# Patient Record
Sex: MALE | Race: BLACK OR AFRICAN AMERICAN | NOT HISPANIC OR LATINO | Employment: UNEMPLOYED | URBAN - METROPOLITAN AREA
[De-identification: names, ages, dates, MRNs, and addresses within clinical notes are randomized per-mention and may not be internally consistent; named-entity substitution may affect disease eponyms.]

---

## 2017-11-03 ENCOUNTER — APPOINTMENT (OUTPATIENT)
Dept: PHYSICAL MEDICINE AND REHAB | Facility: MEDICAL CENTER | Age: 32
End: 2017-11-03
Payer: MEDICARE

## 2018-01-20 PROCEDURE — 99283 EMERGENCY DEPT VISIT LOW MDM: CPT

## 2018-01-20 ASSESSMENT — PAIN SCALES - GENERAL
PAINLEVEL_OUTOF10: 9
PAINLEVEL_OUTOF10: 9

## 2018-01-21 ENCOUNTER — HOSPITAL ENCOUNTER (EMERGENCY)
Facility: MEDICAL CENTER | Age: 33
End: 2018-01-21
Attending: EMERGENCY MEDICINE
Payer: MEDICARE

## 2018-01-21 VITALS
HEART RATE: 98 BPM | RESPIRATION RATE: 16 BRPM | OXYGEN SATURATION: 98 % | TEMPERATURE: 98.7 F | BODY MASS INDEX: 25.25 KG/M2 | SYSTOLIC BLOOD PRESSURE: 110 MMHG | DIASTOLIC BLOOD PRESSURE: 68 MMHG | HEIGHT: 70 IN | WEIGHT: 176.37 LBS

## 2018-01-21 DIAGNOSIS — S00.83XA CONTUSION OF FACE, INITIAL ENCOUNTER: ICD-10-CM

## 2018-01-21 PROCEDURE — 700102 HCHG RX REV CODE 250 W/ 637 OVERRIDE(OP): Performed by: EMERGENCY MEDICINE

## 2018-01-21 PROCEDURE — A9270 NON-COVERED ITEM OR SERVICE: HCPCS | Performed by: EMERGENCY MEDICINE

## 2018-01-21 RX ORDER — IBUPROFEN 600 MG/1
600 TABLET ORAL ONCE
Status: COMPLETED | OUTPATIENT
Start: 2018-01-21 | End: 2018-01-21

## 2018-01-21 RX ORDER — OXYCODONE HYDROCHLORIDE AND ACETAMINOPHEN 5; 325 MG/1; MG/1
1 TABLET ORAL ONCE
Status: COMPLETED | OUTPATIENT
Start: 2018-01-21 | End: 2018-01-21

## 2018-01-21 RX ADMIN — OXYCODONE HYDROCHLORIDE AND ACETAMINOPHEN 1 TABLET: 5; 325 TABLET ORAL at 02:32

## 2018-01-21 RX ADMIN — IBUPROFEN 600 MG: 600 TABLET, FILM COATED ORAL at 02:32

## 2018-01-21 ASSESSMENT — LIFESTYLE VARIABLES: DO YOU DRINK ALCOHOL: NO
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
## 2018-01-21 NOTE — ED NOTES
Pt given d/c instructions/follow up/ home care instructions, pt verbalized understanding of POC, pt ambulated to ER lobby, steady gait.

## 2018-01-24 ENCOUNTER — OFFICE VISIT (OUTPATIENT)
Dept: MEDICAL GROUP | Facility: MEDICAL CENTER | Age: 33
End: 2018-01-24
Payer: MEDICARE

## 2018-01-24 VITALS
DIASTOLIC BLOOD PRESSURE: 72 MMHG | BODY MASS INDEX: 24.77 KG/M2 | RESPIRATION RATE: 16 BRPM | SYSTOLIC BLOOD PRESSURE: 126 MMHG | HEIGHT: 70 IN | OXYGEN SATURATION: 97 % | HEART RATE: 76 BPM | TEMPERATURE: 98.2 F | WEIGHT: 173 LBS

## 2018-01-24 DIAGNOSIS — M51.36 DDD (DEGENERATIVE DISC DISEASE), LUMBAR: ICD-10-CM

## 2018-01-24 DIAGNOSIS — R11.0 NAUSEA: ICD-10-CM

## 2018-01-24 DIAGNOSIS — G43.809 OTHER MIGRAINE WITHOUT STATUS MIGRAINOSUS, NOT INTRACTABLE: ICD-10-CM

## 2018-01-24 DIAGNOSIS — Z23 INFLUENZA VACCINE NEEDED: ICD-10-CM

## 2018-01-24 DIAGNOSIS — Z86.69 HX OF SEIZURE DISORDER: ICD-10-CM

## 2018-01-24 DIAGNOSIS — Z13.220 SCREENING CHOLESTEROL LEVEL: ICD-10-CM

## 2018-01-24 DIAGNOSIS — M25.50 ARTHRALGIA, UNSPECIFIED JOINT: ICD-10-CM

## 2018-01-24 DIAGNOSIS — F31.60 BIPOLAR DISORDER, MIXED (HCC): ICD-10-CM

## 2018-01-24 PROCEDURE — 99214 OFFICE O/P EST MOD 30 MIN: CPT | Mod: 25 | Performed by: NURSE PRACTITIONER

## 2018-01-24 PROCEDURE — G0008 ADMIN INFLUENZA VIRUS VAC: HCPCS | Performed by: NURSE PRACTITIONER

## 2018-01-24 PROCEDURE — 90686 IIV4 VACC NO PRSV 0.5 ML IM: CPT | Performed by: NURSE PRACTITIONER

## 2018-01-24 RX ORDER — NAPROXEN 500 MG/1
500 TABLET ORAL
Qty: 60 TAB | Refills: 2 | Status: SHIPPED | OUTPATIENT
Start: 2018-01-24 | End: 2018-10-17 | Stop reason: SDUPTHER

## 2018-01-24 RX ORDER — LEVETIRACETAM 500 MG/1
500 TABLET ORAL 2 TIMES DAILY
Qty: 60 TAB | Refills: 1 | Status: SHIPPED | OUTPATIENT
Start: 2018-01-24 | End: 2018-08-29 | Stop reason: SDUPTHER

## 2018-01-24 RX ORDER — RISPERIDONE 3 MG/1
3 TABLET ORAL 2 TIMES DAILY
Qty: 60 TAB | Refills: 1 | Status: SHIPPED | OUTPATIENT
Start: 2018-01-24 | End: 2018-08-29 | Stop reason: SDUPTHER

## 2018-01-24 RX ORDER — TIZANIDINE HYDROCHLORIDE 4 MG/1
4 CAPSULE, GELATIN COATED ORAL 3 TIMES DAILY
Qty: 15 CAP | Refills: 0 | Status: SHIPPED | OUTPATIENT
Start: 2018-01-24 | End: 2018-12-06 | Stop reason: CLARIF

## 2018-01-24 RX ORDER — PROMETHAZINE HYDROCHLORIDE 25 MG/1
25 TABLET ORAL 2 TIMES DAILY PRN
Qty: 60 TAB | Refills: 0 | Status: SHIPPED | OUTPATIENT
Start: 2018-01-24 | End: 2018-08-29 | Stop reason: SDUPTHER

## 2018-01-24 ASSESSMENT — ENCOUNTER SYMPTOMS
DEPRESSION: 1
BACK PAIN: 1

## 2018-01-24 ASSESSMENT — PATIENT HEALTH QUESTIONNAIRE - PHQ9: CLINICAL INTERPRETATION OF PHQ2 SCORE: 0

## 2018-01-24 NOTE — PROGRESS NOTES
Subjective:      Hamlet Islas is a 32 y.o. male who presents with Medication Management (med refill/ review meds)        CC: Patient is a very pleasant patient here today accompanied by his wife to reestablish care as well as for any other medicines for his bipolar disorder, arthralgias, back pain, nausea and referral to pain management and the headache clinic.    HPI Hamlet Islas      1. Bipolar disorder, mixed (CMS-[facility])  Patient has not been seen at this office since October 2016. He moved to [place] in 2016 and just recently moved back to the area to be near his children. He has history of bipolar disorder and states he has already made an appointment to be seen by psychiatry for refill on his medicines but it will be in 3 weeks. He was like to get refills on his Risperdal and Keppra since he has been out for about 2 months.    2. Hx of seizure disorder  Patient also has history of seizure disorder and is currently on Keppra for this and reports no seizures but does need a neurology follow-up.    3. DDD (degenerative disc disease), lumbar  Patient previously was going to Dr. Martin who was treating him for his back pain and arthralgias. He was on tramadol at one time. He states he has had no medicines except for over-the-counter ibuprofen for a while and would like to get into a treatment program again. He had many imaging studies done in 2015.    4. Arthralgia, unspecified joint  Patient also has history of chronic joint pains without rheumatoid arthritis for which she was seen pain management. He would like to get at least a prescription for his Naprosyn refilled.    5. Other migraine without status migrainosus, not intractable  Patient would like to go to her migraine clinic to look into options for migraine treatment including Botox injections. He currently only uses Naprosyn.    6. Nausea  Patient would like to get a refill on his Phenergan which she would use because of the nausea associated with his  psychiatric medicines.    7. Influenza vaccine needed  Patient has not had flu immunization this year.      Social History   Substance Use Topics   • Smoking status: Current Every Day Smoker     Packs/day: 0.50     Years: 2.00     Types: Cigarettes   • Smokeless tobacco: Never Used   • Alcohol use No     Current Outpatient Prescriptions   Medication Sig Dispense Refill   • risperidone (RISPERDAL) 3 MG Tab Take 1 Tab by mouth 2 times a day. 60 Tab 1   • levetiracetam (KEPPRA) 500 MG Tab Take 1 Tab by mouth 2 times a day. 60 Tab 1   • tizanidine (ZANAFLEX) 4 MG capsule Take 1 Cap by mouth 3 times a day. 15 Cap 0   • promethazine (PHENERGAN) 25 MG Tab Take 1 Tab by mouth 2 times a day as needed for Nausea/Vomiting. 60 Tab 0   • naproxen (NAPROSYN) 500 MG Tab Take 1 Tab by mouth 2 times daily with meals as needed. 60 Tab 2   • naproxen (NAPROSYN) 500 MG Tab Take 1 tablet by mouth twice per day as needed for pain 40 Tab 2   • lidocaine (LIDODERM) 5 % Patch Apply 1 Patch to skin as directed every 24 hours. 30 Patch 0     No current facility-administered medications for this visit.      Family History   Problem Relation Age of Onset   • Other Mother      discoid lupus   • Cancer Mother      leukemia   • Hyperlipidemia Mother    • Hypertension Mother    • Cancer Father      lung     Past Medical History:   Diagnosis Date   • Asthma    • Bipolar disorder (CMS-[facility])    • Bipolar disorder with depression (CMS-[facility]) 9/8/2014   • Chronic hip pain 9/8/2014   • Chronic low back pain    • Migraine    • Neuropathy (CMS-HCC)    • Obsessive compulsive disorder    • Post traumatic stress disorder (PTSD)    • Psoriasis    • Schizoaffective disorder (CMS-HCC)    • Scoliosis 9/8/2014   • Tendinitis of foot     left foot, chronic       Review of Systems   Musculoskeletal: Positive for back pain and joint pain.   Psychiatric/Behavioral: Positive for depression.   All other systems reviewed and are negative.         Objective:     /72    "Pulse 76   Temp 36.8 °C (98.2 °F)   Resp 16   Ht 1.778 m (5' 10\")   Wt 78.5 kg (173 lb)   SpO2 97%   BMI 24.82 kg/m²      Physical Exam   Constitutional: He is oriented to person, place, and time. He appears well-developed and well-nourished. No distress.   HENT:   Head: Normocephalic and atraumatic.   Right Ear: External ear normal.   Left Ear: External ear normal.   Nose: Nose normal.   Mouth/Throat: Oropharynx is clear and moist.   Eyes: Conjunctivae are normal. Right eye exhibits no discharge. Left eye exhibits no discharge.   Neck: Normal range of motion. Neck supple. No tracheal deviation present. No thyromegaly present.   Cardiovascular: Normal rate, regular rhythm and normal heart sounds.    No murmur heard.  Pulmonary/Chest: Effort normal and breath sounds normal. No respiratory distress. He has no wheezes. He has no rales.   Musculoskeletal:        Lumbar back: He exhibits decreased range of motion and pain.   Patient points to various joints which also causes him pain although there is been no redness or swelling.   Lymphadenopathy:     He has no cervical adenopathy.   Neurological: He is alert and oriented to person, place, and time. Coordination normal.   Skin: Skin is warm and dry. No rash noted. He is not diaphoretic. No erythema.   Psychiatric: He has a normal mood and affect. His behavior is normal. Judgment and thought content normal.   Nursing note and vitals reviewed.              Assessment/Plan:     1. Bipolar disorder, mixed (CMS-[facility])  I will fill patient's medicines until he can get into psychiatry to take over prescribing his psychiatric medicines.  - risperidone (RISPERDAL) 3 MG Tab; Take 1 Tab by mouth 2 times a day.  Dispense: 60 Tab; Refill: 1  - promethazine (PHENERGAN) 25 MG Tab; Take 1 Tab by mouth 2 times a day as needed for Nausea/Vomiting.  Dispense: 60 Tab; Refill: 0  - TSH; Future  - COMP METABOLIC PANEL; Future    2. Hx of seizure disorder  Patient does not appear to have " had a seizure despite being off medication but I will refill it until he can get in to neurology.  - levetiracetam (KEPPRA) 500 MG Tab; Take 1 Tab by mouth 2 times a day.  Dispense: 60 Tab; Refill: 1  - TSH; Future  - COMP METABOLIC PANEL; Future    3. DDD (degenerative disc disease), lumbar  I have refilled patient's Naprosyn and Zanaflex. I advised him if he feels he needs to be on anything stronger he will need to talk with pain management. He had previously been on tramadol but I would not suggest going back on this with his seizure disorder history.  - REFERRAL TO NEUROLOGY  - tizanidine (ZANAFLEX) 4 MG capsule; Take 1 Cap by mouth 3 times a day.  Dispense: 15 Cap; Refill: 0  - naproxen (NAPROSYN) 500 MG Tab; Take 1 Tab by mouth 2 times daily with meals as needed.  Dispense: 60 Tab; Refill: 2  - REFERRAL TO PAIN CLINIC    4. Arthralgia, unspecified joint  Patient has Naprosyn and muscle relaxer to use for pain for now.  - REFERRAL TO PAIN CLINIC    5. Other migraine without status migrainosus, not intractable    - REFERRAL TO NEUROLOGY  - naproxen (NAPROSYN) 500 MG Tab; Take 1 Tab by mouth 2 times daily with meals as needed.  Dispense: 60 Tab; Refill: 2    6. Nausea  I will prescribe this for one month but if he feels he needs to be on this longer he will need to talk to his psychiatrist because of the risk for tardive dyskinesia.  - promethazine (PHENERGAN) 25 MG Tab; Take 1 Tab by mouth 2 times a day as needed for Nausea/Vomiting.  Dispense: 60 Tab; Refill: 0    7. Influenza vaccine needed  I have placed the below orders and discussed them with an approved delegating provider. The MA is performing the below orders under the direction of Dr. Miguel    - Flu Quad Inj >3 Year Pre-Filled PF    8. Screening cholesterol level    - LIPID PROFILE; Future

## 2018-03-10 ENCOUNTER — HOSPITAL ENCOUNTER (EMERGENCY)
Dept: HOSPITAL 8 - ED | Age: 33
Discharge: HOME | End: 2018-03-10
Payer: MEDICARE

## 2018-03-10 VITALS — SYSTOLIC BLOOD PRESSURE: 120 MMHG | DIASTOLIC BLOOD PRESSURE: 70 MMHG

## 2018-03-10 VITALS — HEIGHT: 72 IN | WEIGHT: 175.93 LBS | BODY MASS INDEX: 23.83 KG/M2

## 2018-03-10 DIAGNOSIS — F17.200: ICD-10-CM

## 2018-03-10 DIAGNOSIS — H53.149: ICD-10-CM

## 2018-03-10 DIAGNOSIS — G43.009: Primary | ICD-10-CM

## 2018-03-10 DIAGNOSIS — F12.10: ICD-10-CM

## 2018-03-10 PROCEDURE — 99283 EMERGENCY DEPT VISIT LOW MDM: CPT

## 2018-03-10 PROCEDURE — 96372 THER/PROPH/DIAG INJ SC/IM: CPT

## 2018-03-15 ENCOUNTER — HOSPITAL ENCOUNTER (EMERGENCY)
Facility: MEDICAL CENTER | Age: 33
End: 2018-03-15
Attending: EMERGENCY MEDICINE
Payer: MEDICARE

## 2018-03-15 VITALS
DIASTOLIC BLOOD PRESSURE: 92 MMHG | WEIGHT: 174.38 LBS | BODY MASS INDEX: 25.83 KG/M2 | RESPIRATION RATE: 18 BRPM | OXYGEN SATURATION: 99 % | HEART RATE: 86 BPM | TEMPERATURE: 97.4 F | SYSTOLIC BLOOD PRESSURE: 115 MMHG | HEIGHT: 69 IN

## 2018-03-15 DIAGNOSIS — S39.012A STRAIN OF LUMBAR REGION, INITIAL ENCOUNTER: ICD-10-CM

## 2018-03-15 PROCEDURE — 700102 HCHG RX REV CODE 250 W/ 637 OVERRIDE(OP): Performed by: EMERGENCY MEDICINE

## 2018-03-15 PROCEDURE — 99283 EMERGENCY DEPT VISIT LOW MDM: CPT

## 2018-03-15 PROCEDURE — A9270 NON-COVERED ITEM OR SERVICE: HCPCS | Performed by: EMERGENCY MEDICINE

## 2018-03-15 RX ORDER — ACETAMINOPHEN 325 MG/1
650 TABLET ORAL ONCE
Status: COMPLETED | OUTPATIENT
Start: 2018-03-15 | End: 2018-03-15

## 2018-03-15 RX ADMIN — ACETAMINOPHEN 650 MG: 325 TABLET, FILM COATED ORAL at 06:06

## 2018-03-15 ASSESSMENT — PAIN SCALES - GENERAL
PAINLEVEL_OUTOF10: 8
PAINLEVEL_OUTOF10: 8

## 2018-03-15 NOTE — ED PROVIDER NOTES
"ED Provider Note      CHIEF COMPLAINT  Chief Complaint   Patient presents with   • Low Back Pain     x4 days, \"It's a viral flare from my fibromyalgia. My RT knee gave out at 10pm and I was up and pacing all night in pain. My RT lower back is all knotted up and crazy with pressure\".    • Knee Pain       HPI  Halmet Islas is a 32 y.o. male who presents with low back pain. Patient is in the process of moving. He has been lifting a lot of boxes. He has a chronically bad right knee that gives away periodically. He states that he was carrying a box his knee gave way he caught himself and didn't fall. He had pain in his right low back since then. This started about 4 days ago. Does not radiate. No bowel or bladder dysfunction. No fevers. No abdominal pain nausea vomiting. He does have a tendency to have back pain in the past as well. He took some naproxen which helped to some degree, but still persistent pain.        REVIEW OF SYSTEMS  Denies any weakness in the lower extremities. No bowel or bladder incontinence or saddle anesthesia. No fevers or chills. No injection drug use or IV drug abuse. No abdominal pain, nausea or vomiting. No dysuria, hematuria or flank pain.    PAST MEDICAL HISTORY  Past Medical History:   Diagnosis Date   • Asthma    • Bipolar disorder (CMS-HCC)    • Bipolar disorder with depression (CMS-HCC) 9/8/2014   • Chronic hip pain 9/8/2014   • Chronic low back pain    • Migraine    • Neuropathy (CMS-HCC)    • Obsessive compulsive disorder    • Post traumatic stress disorder (PTSD)    • Psoriasis    • Schizoaffective disorder (CMS-HCC)    • Scoliosis 9/8/2014   • Tendinitis of foot     left foot, chronic       FAMILY HISTORY  Family History   Problem Relation Age of Onset   • Other Mother      discoid lupus   • Cancer Mother      leukemia   • Hyperlipidemia Mother    • Hypertension Mother    • Cancer Father      lung       SOCIAL HISTORY  Social History   Substance Use Topics   • Smoking status: " "Current Every Day Smoker     Packs/day: 0.50     Years: 2.00     Types: Cigarettes   • Smokeless tobacco: Never Used   • Alcohol use No       SURGICAL HISTORY  Past Surgical History:   Procedure Laterality Date   • OTHER      oral       CURRENT MEDICATIONS  Home Medications    **Home medications have not yet been reviewed for this encounter**         ALLERGIES  No Known Allergies      PHYSICAL EXAM  VITAL SIGNS: /92   Pulse 86   Temp 36.3 °C (97.4 °F)   Resp 18   Ht 1.753 m (5' 9\")   Wt 79.1 kg (174 lb 6.1 oz)   SpO2 99%   BMI 25.75 kg/m²   Constitutional: Generally nontoxic  Neck: Grossly normal range of motion  Cardiovascular: Normal heart rate   Thorax & Lungs: No respiratory distress  Abdomen: Bowel sounds normal, soft, non-distended, nontender, no mass  Skin: Warm, Dry, No rash.   Back: Diffuse right lower lumbar tenderness, no step-off or deformity  Extremities: No joint effusion. No gross ligamentous laxity of the right knee. Full range of motion. Ambulatory. No focal bony tenderness  Neurologic: Grossly normal cranial nerves, 5 out of 5 EHL, FHL, gastrocnemius, tibialis anterior. Normal gait. Normal sensory throughout.      COURSE & MEDICAL DECISION MAKING    Patient presents with acute exacerbation of chronic back pain. History is most consistent with a strain. No AHCPR risk factors. He was given Tylenol. Advised continue naproxen as needed. Discussed proper use of Tylenol. He states he will follow-up with his doctor this week. I asked him to return to the ER for any acute symptoms, fevers, weakness in extremities or concern.      FINAL IMPRESSION  1. Acute lumbar strain  2. Acute exacerbation of chronic back pain        This dictation was created using voice recognition software. The accuracy of the dictation is limited to the abilities of the software.  The nursing notes were reviewed and certain aspects of this information were incorporated into this note.    Electronically signed by: Rodriguez" OSMAR Graham, 3/15/2018 6:02 AM

## 2018-03-15 NOTE — ED NOTES
Pt laying in bed with box of life cereal. Pt sleeping, pt woke up, medicated. Pt medicated for pain. Pt given all dc instructions. Pt encouraged to follow up with primary care doctor. Pt stated he would call for appt today. Pt verbalized understanding. Pt ambulated to [facility].

## 2018-03-15 NOTE — ED TRIAGE NOTES
"Chief Complaint   Patient presents with   • Low Back Pain     x4 days, \"It's a viral flare from my fibromyalgia. My RT knee gave out at 10pm and I was up and pacing all night in pain. My RT lower back is all knotted up and crazy with pressure\".    • Knee Pain     Pt amb to triage with strange gait. Denies need for w/c. Denies new numbness, states \"I have it in my regular spots\". Pt returned to lobby. Educated on triage process and to inform staff of any changes.     /92   Pulse 86   Temp 36.3 °C (97.4 °F)   Resp 18   Ht 1.753 m (5' 9\")   Wt 79.1 kg (174 lb 6.1 oz)   SpO2 99%   BMI 25.75 kg/m²     "

## 2018-04-25 ENCOUNTER — HOSPITAL ENCOUNTER (EMERGENCY)
Dept: HOSPITAL 8 - ED | Age: 33
Discharge: HOME | End: 2018-04-25
Payer: MEDICARE

## 2018-04-25 VITALS — WEIGHT: 174.61 LBS | BODY MASS INDEX: 25.28 KG/M2 | HEIGHT: 69.5 IN

## 2018-04-25 VITALS — SYSTOLIC BLOOD PRESSURE: 118 MMHG | DIASTOLIC BLOOD PRESSURE: 76 MMHG

## 2018-04-25 DIAGNOSIS — L03.115: ICD-10-CM

## 2018-04-25 DIAGNOSIS — L03.116: Primary | ICD-10-CM

## 2018-04-25 DIAGNOSIS — J45.909: ICD-10-CM

## 2018-04-25 DIAGNOSIS — M19.90: ICD-10-CM

## 2018-04-25 DIAGNOSIS — F31.9: ICD-10-CM

## 2018-04-25 PROCEDURE — 82962 GLUCOSE BLOOD TEST: CPT

## 2018-04-25 PROCEDURE — 99283 EMERGENCY DEPT VISIT LOW MDM: CPT

## 2018-05-01 ENCOUNTER — HOSPITAL ENCOUNTER (EMERGENCY)
Facility: MEDICAL CENTER | Age: 33
End: 2018-05-01
Attending: EMERGENCY MEDICINE
Payer: MEDICARE

## 2018-05-01 VITALS
WEIGHT: 177.91 LBS | HEIGHT: 70 IN | SYSTOLIC BLOOD PRESSURE: 108 MMHG | RESPIRATION RATE: 18 BRPM | HEART RATE: 111 BPM | OXYGEN SATURATION: 98 % | TEMPERATURE: 98.1 F | DIASTOLIC BLOOD PRESSURE: 68 MMHG | BODY MASS INDEX: 25.47 KG/M2

## 2018-05-01 DIAGNOSIS — L08.9 BACTERIAL SKIN INFECTION: ICD-10-CM

## 2018-05-01 DIAGNOSIS — B96.89 BACTERIAL SKIN INFECTION: ICD-10-CM

## 2018-05-01 PROCEDURE — 99283 EMERGENCY DEPT VISIT LOW MDM: CPT

## 2018-05-01 RX ORDER — CEPHALEXIN 500 MG/1
500 CAPSULE ORAL 4 TIMES DAILY
Qty: 28 CAP | Refills: 0 | Status: SHIPPED | OUTPATIENT
Start: 2018-05-01 | End: 2018-08-29

## 2018-05-01 RX ORDER — SULFAMETHOXAZOLE AND TRIMETHOPRIM 800; 160 MG/1; MG/1
1 TABLET ORAL 2 TIMES DAILY
Qty: 14 TAB | Refills: 0 | Status: SHIPPED | OUTPATIENT
Start: 2018-05-01 | End: 2018-05-08

## 2018-05-01 RX ORDER — MELOXICAM 7.5 MG/1
7.5 TABLET ORAL DAILY
Qty: 30 TAB | Refills: 0 | Status: SHIPPED | OUTPATIENT
Start: 2018-05-01 | End: 2018-08-29 | Stop reason: SDUPTHER

## 2018-05-01 ASSESSMENT — PAIN SCALES - GENERAL: PAINLEVEL_OUTOF10: 8

## 2018-05-01 NOTE — DISCHARGE INSTRUCTIONS
Skin Infections  A skin infection usually develops as a result of disruption of the skin barrier.   CAUSES   A skin infection might occur following:  · Trauma or an injury to the skin such as a cut or insect sting.   · Inflammation (as in eczema).   · Breaks in the skin between the toes (as in athlete's foot).   · Swelling (edema).   SYMPTOMS   The legs are the most common site affected. Usually there is:  · Redness.   · Swelling.   · Pain.   · There may be red streaks in the area of the infection.   TREATMENT   · Minor skin infections may be treated with topical antibiotics, but if the skin infection is severe, hospital care and intravenous (IV) antibiotic treatment may be needed.   · Most often skin infections can be treated with oral antibiotic medicine as well as proper rest and elevation of the affected area until the infection improves.   · If you are prescribed oral antibiotics, it is important to take them as directed and to take all the pills even if you feel better before you have finished all of the medicine.   · You may apply warm compresses to the area for 20-30 minutes 4 times daily.   You might need a tetanus shot now if:  · You have no idea when you had the last one.   · You have never had a tetanus shot before.   · Your wound had dirt in it.   If you need a tetanus shot and you decide not to get one, there is a rare chance of getting tetanus. Sickness from tetanus can be serious. If you get a tetanus shot, your arm may swell and become red and warm at the shot site. This is common and not a problem.  SEEK MEDICAL CARE IF:   The pain and swelling from your infection do not improve within 2 days.   SEEK IMMEDIATE MEDICAL CARE IF:   You develop a fever, chills, or other serious problems.   Document Released: 01/25/2006 Document Revised: 03/11/2013 Document Reviewed: 12/07/2009  [employer]® Patient Information ©2013 [employer].

## 2018-05-01 NOTE — ED TRIAGE NOTES
"Chief Complaint   Patient presents with   • Skin Lesion     x13 days. \"I have all these lesions on my legs.\" Multiple lesions to bilateral thighs; reports some of the \"ooze different colors\".      Pt amb to triage with above. Hx of fibromyalgia. Denies fevers. Denies ETOH/drugs (other than thc). Pt returned to lobby. Educated on triage process and to inform staff of any changes.     /68   Pulse (!) 111   Temp 36.7 °C (98.1 °F) (Temporal)   Resp 18   Ht 1.778 m (5' 10\")   Wt 80.7 kg (177 lb 14.6 oz)   SpO2 98%   BMI 25.53 kg/m²     "

## 2018-05-01 NOTE — ED PROVIDER NOTES
"ED Provider Note    CHIEF COMPLAINT  Chief Complaint   Patient presents with   • Skin Lesion     x13 days. \"I have all these lesions on my legs.\" Multiple lesions to bilateral thighs; reports some of the \"ooze different colors\".        HPI  Hamlet Islas is a 32 y.o. male who presents for evaluation of skin lesions for the past 2 weeks. He states located on his legs. They sometimes draining. He's had no fevers. He is followed primarily by Dr. Pop whom he states he is going to see in the next couple of days.    REVIEW OF SYSTEMS  See HPI for further details. All other systems are negative.     PAST MEDICAL HISTORY  Past Medical History:   Diagnosis Date   • Asthma    • Bipolar disorder (CMS-HCC) ([facility])    • Bipolar disorder with depression (CMS-HCC) ([facility]) 9/8/2014   • Chronic hip pain 9/8/2014   • Chronic low back pain    • Migraine    • Neuropathy (CMS-HCC) ([facility])    • Obsessive compulsive disorder    • Post traumatic stress disorder (PTSD)    • Psoriasis    • Schizoaffective disorder (CMS-HCC) ([facility])    • Scoliosis 9/8/2014   • Tendinitis of foot     left foot, chronic       FAMILY HISTORY  Family History   Problem Relation Age of Onset   • Other Mother      discoid lupus   • Cancer Mother      leukemia   • Hyperlipidemia Mother    • Hypertension Mother    • Cancer Father      lung       SOCIAL HISTORY  Social History     Social History   • Marital status:      Spouse name: N/A   • Number of children: N/A   • Years of education: N/A     Social History Main Topics   • Smoking status: Current Every Day Smoker     Packs/day: 0.50     Years: 2.00     Types: Cigarettes   • Smokeless tobacco: Never Used   • Alcohol use No   • Drug use: Yes     Types: Intravenous, Inhaled   • Sexual activity: Yes     Partners: Female     Other Topics Concern   • Not on file     Social History Narrative   • No narrative on file       SURGICAL HISTORY  Past Surgical History:   Procedure Laterality Date   • OTHER      oral " "      CURRENT MEDICATIONS  Home Medications    **Home medications have not yet been reviewed for this encounter**         ALLERGIES  No Known Allergies    PHYSICAL EXAM  VITAL SIGNS: /68   Pulse (!) 111   Temp 36.7 °C (98.1 °F) (Temporal)   Resp 18   Ht 1.778 m (5' 10\")   Wt 80.7 kg (177 lb 14.6 oz)   SpO2 98%   BMI 25.53 kg/m²     Constitutional: Well developed, Well nourished, No acute distress, Non-toxic appearance.   HENT: Normocephalic, Atraumatic.   Cardiovascular: Tachycardic.   Thorax & Lungs: No respiratory distress.   Skin: Warm, Dry.   Extremities: Bilateral lower extremity small pustular lesions. There are excoriated scabbed lesions as well mostly on the left lateral thigh region.  Musculoskeletal: Good range of motion in all major joints.  Neurologic: Awake alert.    COURSE & MEDICAL DECISION MAKING  Pertinent Labs & Imaging studies reviewed. (See chart for details)  Is a 32-year-old here for evaluation of sores on his legs. Patient does have some pustular lesions that are small. He also has scabbed over lesions that I suspect represent MRSA. I will start him on Keflex and Bactrim. He states he will follow-up with his primary care provider. He is given a discharge instruction sheet on skin infections.    FINAL IMPRESSION  1. Multiple bilateral lower extremity lesions suspicious for MRSA  2.   3.         Electronically signed by: Berhane Cantu, 5/1/2018 7:00 AM    "

## 2018-05-23 ENCOUNTER — TELEPHONE (OUTPATIENT)
Dept: MEDICAL GROUP | Facility: MEDICAL CENTER | Age: 33
End: 2018-05-23

## 2018-06-26 ENCOUNTER — HOSPITAL ENCOUNTER (EMERGENCY)
Dept: HOSPITAL 8 - ED | Age: 33
LOS: 1 days | Discharge: HOME | End: 2018-06-27
Payer: MEDICARE

## 2018-06-26 VITALS — WEIGHT: 195.99 LBS | HEIGHT: 69 IN | BODY MASS INDEX: 29.03 KG/M2

## 2018-06-26 VITALS — SYSTOLIC BLOOD PRESSURE: 120 MMHG | DIASTOLIC BLOOD PRESSURE: 80 MMHG

## 2018-06-26 DIAGNOSIS — H54.7: ICD-10-CM

## 2018-06-26 DIAGNOSIS — J45.909: ICD-10-CM

## 2018-06-26 DIAGNOSIS — H57.8: ICD-10-CM

## 2018-06-26 DIAGNOSIS — H57.12: Primary | ICD-10-CM

## 2018-06-26 DIAGNOSIS — M79.7: ICD-10-CM

## 2018-06-26 PROCEDURE — 99283 EMERGENCY DEPT VISIT LOW MDM: CPT

## 2018-06-29 ENCOUNTER — TELEPHONE (OUTPATIENT)
Dept: MEDICAL GROUP | Facility: MEDICAL CENTER | Age: 33
End: 2018-06-29

## 2018-08-20 ENCOUNTER — HOSPITAL ENCOUNTER (EMERGENCY)
Dept: HOSPITAL 8 - ED | Age: 33
Discharge: HOME | End: 2018-08-20
Payer: MEDICARE

## 2018-08-20 VITALS — BODY MASS INDEX: 25.03 KG/M2 | WEIGHT: 174.83 LBS | HEIGHT: 70 IN

## 2018-08-20 VITALS — SYSTOLIC BLOOD PRESSURE: 96 MMHG | DIASTOLIC BLOOD PRESSURE: 62 MMHG

## 2018-08-20 DIAGNOSIS — J45.909: ICD-10-CM

## 2018-08-20 DIAGNOSIS — F17.200: ICD-10-CM

## 2018-08-20 DIAGNOSIS — J00: ICD-10-CM

## 2018-08-20 DIAGNOSIS — F31.9: ICD-10-CM

## 2018-08-20 DIAGNOSIS — G43.009: Primary | ICD-10-CM

## 2018-08-20 DIAGNOSIS — F41.1: ICD-10-CM

## 2018-08-20 PROCEDURE — 99284 EMERGENCY DEPT VISIT MOD MDM: CPT

## 2018-08-20 PROCEDURE — 71046 X-RAY EXAM CHEST 2 VIEWS: CPT

## 2018-08-29 ENCOUNTER — OFFICE VISIT (OUTPATIENT)
Dept: MEDICAL GROUP | Facility: MEDICAL CENTER | Age: 33
End: 2018-08-29
Payer: MEDICARE

## 2018-08-29 VITALS
TEMPERATURE: 98.6 F | WEIGHT: 171 LBS | HEART RATE: 76 BPM | DIASTOLIC BLOOD PRESSURE: 72 MMHG | HEIGHT: 70 IN | RESPIRATION RATE: 16 BRPM | BODY MASS INDEX: 24.48 KG/M2 | SYSTOLIC BLOOD PRESSURE: 114 MMHG | OXYGEN SATURATION: 98 %

## 2018-08-29 DIAGNOSIS — R11.0 NAUSEA: ICD-10-CM

## 2018-08-29 DIAGNOSIS — M51.36 DDD (DEGENERATIVE DISC DISEASE), LUMBAR: ICD-10-CM

## 2018-08-29 DIAGNOSIS — F41.9 ANXIETY: ICD-10-CM

## 2018-08-29 DIAGNOSIS — Z91.199 NON-COMPLIANCE: ICD-10-CM

## 2018-08-29 DIAGNOSIS — Z86.69 HX OF SEIZURE DISORDER: ICD-10-CM

## 2018-08-29 DIAGNOSIS — F31.60 BIPOLAR DISORDER, MIXED (HCC): ICD-10-CM

## 2018-08-29 PROCEDURE — 99214 OFFICE O/P EST MOD 30 MIN: CPT | Performed by: NURSE PRACTITIONER

## 2018-08-29 RX ORDER — MELOXICAM 7.5 MG/1
7.5 TABLET ORAL DAILY
Qty: 30 TAB | Refills: 0 | Status: SHIPPED | OUTPATIENT
Start: 2018-08-29 | End: 2018-08-30 | Stop reason: SDUPTHER

## 2018-08-29 RX ORDER — RISPERIDONE 3 MG/1
3 TABLET ORAL 2 TIMES DAILY
Qty: 60 TAB | Refills: 1 | Status: SHIPPED | OUTPATIENT
Start: 2018-08-29 | End: 2018-12-06 | Stop reason: CLARIF

## 2018-08-29 RX ORDER — LEVETIRACETAM 500 MG/1
500 TABLET ORAL 2 TIMES DAILY
Qty: 60 TAB | Refills: 1 | Status: ON HOLD | OUTPATIENT
Start: 2018-08-29 | End: 2018-12-10

## 2018-08-29 RX ORDER — PROMETHAZINE HYDROCHLORIDE 25 MG/1
25 TABLET ORAL 2 TIMES DAILY PRN
Qty: 60 TAB | Refills: 0 | Status: SHIPPED | OUTPATIENT
Start: 2018-08-29 | End: 2018-12-06 | Stop reason: CLARIF

## 2018-08-29 RX ORDER — ALPRAZOLAM 0.5 MG/1
0.5 TABLET ORAL NIGHTLY PRN
Qty: 5 TAB | Refills: 0 | Status: SHIPPED | OUTPATIENT
Start: 2018-08-29 | End: 2018-09-03

## 2018-08-29 ASSESSMENT — ENCOUNTER SYMPTOMS
BACK PAIN: 1
NERVOUS/ANXIOUS: 1
DEPRESSION: 1
SEIZURES: 1

## 2018-08-29 NOTE — PROGRESS NOTES
Subjective:      Hamlet Islas is a 33 y.o. male who presents with Ankle Pain (x 2 days)        CC: Patient here today requesting medicine for pain and anxiety as well as needing to referrals for neurology and pain management.    HPI Hamlet Islas      1. DDD (degenerative disc disease), lumbar  Patient at one time was going to  for chronic back problems. He moved to [place] last year and subsequently never followed back. He now would like to get a new referral to pain management. He was referred back in January but schedule he was unable to contact him. He is asking today for tramadol which he has used in the past for pain. He was seen at the emergency room in March for acute back pain and was put on Naprosyn.    2. Anxiety  Patient reports he is also having issues with anxiety. He does have history of bipolar disorder and when I last saw him in January he was referred to psychiatry but apparently never followed up. When questioned how he is still using his Risperdal, he apparently has been going to the [facility] clinic which prescribed it. He states he will be going to [facility] today to set up an appointment.     3. Bipolar disorder, mixed (HCC)  Patient states he is currently on Risperdal through [place] clinic.    4. Non-compliance  Patient has had problems with noncompliance since I have known him. He has history of at least 3 no-shows with pain management and then has had 2 no-shows for myself since May. He has been referred to specialist but has not followed through.    5. Hx of seizure disorder  Patient states he is taking his Keppra but needs a new referral to neurology to be managed on the medication.  Social History   Substance Use Topics   • Smoking status: Current Every Day Smoker     Packs/day: 0.50     Years: 2.00     Types: Cigarettes   • Smokeless tobacco: Never Used   • Alcohol use No     Family History   Problem Relation Age of Onset   • Other Mother         discoid lupus   •  "Cancer Mother         leukemia   • Hyperlipidemia Mother    • Hypertension Mother    • Cancer Father         lung     Past Medical History:   Diagnosis Date   • Asthma    • Bipolar disorder ([facility])    • Bipolar disorder with depression (HCC) 9/8/2014   • Chronic hip pain 9/8/2014   • Chronic low back pain    • Migraine    • Neuropathy (HCC)    • Obsessive compulsive disorder    • Post traumatic stress disorder (PTSD)    • Psoriasis    • Schizoaffective disorder (HCC)    • Scoliosis 9/8/2014   • Tendinitis of foot     left foot, chronic       Review of Systems   Musculoskeletal: Positive for back pain.   Neurological: Positive for seizures.   Psychiatric/Behavioral: Positive for depression. The patient is nervous/anxious.    All other systems reviewed and are negative.         Objective:     /72   Pulse 76   Temp 37 °C (98.6 °F)   Resp 16   Ht 1.778 m (5' 10\")   Wt 77.6 kg (171 lb)   SpO2 98%   BMI 24.54 kg/m²      Physical Exam   Constitutional: He is oriented to person, place, and time. He appears well-developed and well-nourished. No distress.   HENT:   Head: Normocephalic and atraumatic.   Right Ear: External ear normal.   Left Ear: External ear normal.   Nose: Nose normal.   Mouth/Throat: Oropharynx is clear and moist.   Eyes: Conjunctivae are normal. Right eye exhibits no discharge. Left eye exhibits no discharge.   Neck: Normal range of motion. Neck supple. No tracheal deviation present. No thyromegaly present.   Cardiovascular: Normal rate, regular rhythm and normal heart sounds.    No murmur heard.  Pulmonary/Chest: Effort normal and breath sounds normal. No respiratory distress. He has no wheezes. He has no rales.   Lymphadenopathy:     He has no cervical adenopathy.   Neurological: He is alert and oriented to person, place, and time. Coordination normal.   Skin: Skin is warm and dry. No rash noted. He is not diaphoretic. No erythema.   Psychiatric: His behavior is normal. Judgment and thought " content normal. His mood appears anxious.   Nursing note and vitals reviewed.              Assessment/Plan:       1. DDD (degenerative disc disease), lumbar  I have placed a new referral to pain management and will prescribe his meloxicam. It is unclear whether he has been taking meloxicam as well as Naprosyn. I declined refilling his controlled substance.  - REFERRAL TO PAIN CLINIC    2. Anxiety  Patient again reminded he needs to see psychiatry and I will give him #5 Xanax only for breakthrough anxiety. I do not see that he is on an opioid or other controlled substance currently.  - ALPRAZolam (XANAX) 0.5 MG Tab; Take 1 Tab by mouth at bedtime as needed for Anxiety for up to 5 days.  Dispense: 5 Tab; Refill: 0    3. Bipolar disorder, mixed (HCC)  I will refill patient's medication short-term until he can get into psychiatry.  - risperiDONE (RISPERDAL) 3 MG Tab; Take 1 Tab by mouth 2 times a day.  Dispense: 60 Tab; Refill: 1  - promethazine (PHENERGAN) 25 MG Tab; Take 1 Tab by mouth 2 times a day as needed for Nausea/Vomiting.  Dispense: 60 Tab; Refill: 0    4. Non-compliance  I reviewed with patient his frequent no-shows and compliance with referrals to specialty care and medication usage. I explained that he needs to be more compliant for his health status.     5. Hx of seizure disorder  Patient given Keppra but needs to follow-up with neurology in a new referral has been placed.  - REFERRAL TO NEUROLOGY  - levETIRAcetam (KEPPRA) 500 MG Tab; Take 1 Tab by mouth 2 times a day.  Dispense: 60 Tab; Refill: 1

## 2018-08-30 RX ORDER — MELOXICAM 7.5 MG/1
7.5 TABLET ORAL DAILY
Qty: 30 TAB | Refills: 3 | Status: SHIPPED | OUTPATIENT
Start: 2018-08-30 | End: 2018-09-01 | Stop reason: SDUPTHER

## 2018-09-04 RX ORDER — MELOXICAM 7.5 MG/1
TABLET ORAL
Qty: 90 TAB | Refills: 1 | Status: SHIPPED | OUTPATIENT
Start: 2018-09-04 | End: 2018-12-06 | Stop reason: CLARIF

## 2018-09-19 ENCOUNTER — HOSPITAL ENCOUNTER (EMERGENCY)
Facility: MEDICAL CENTER | Age: 33
End: 2018-09-19
Attending: EMERGENCY MEDICINE

## 2018-09-19 ENCOUNTER — APPOINTMENT (OUTPATIENT)
Dept: RADIOLOGY | Facility: MEDICAL CENTER | Age: 33
End: 2018-09-19
Attending: EMERGENCY MEDICINE

## 2018-09-19 VITALS
RESPIRATION RATE: 16 BRPM | HEART RATE: 75 BPM | OXYGEN SATURATION: 98 % | DIASTOLIC BLOOD PRESSURE: 60 MMHG | SYSTOLIC BLOOD PRESSURE: 114 MMHG

## 2018-09-19 DIAGNOSIS — S93.401A SPRAIN OF RIGHT ANKLE, UNSPECIFIED LIGAMENT, INITIAL ENCOUNTER: ICD-10-CM

## 2018-09-19 PROCEDURE — 99283 EMERGENCY DEPT VISIT LOW MDM: CPT

## 2018-09-19 PROCEDURE — 73610 X-RAY EXAM OF ANKLE: CPT | Mod: RT

## 2018-09-19 NOTE — ED PROVIDER NOTES
"ED Provider Note    CHIEF COMPLAINT  No chief complaint on file.       HPI    Primary care provider: No primary care provider on file.   History obtained from: Patient and his wife  History limited by: Patient poor historian    Hamlet Islas is a 34 y.o. male who presents to the ED complaining of right ankle injury \"right before\" coming to the ED.  Wife reports that patient was walking and \"twisted\" his ankle.  Patient is unsure of the exact direction or mechanism of his injury.  He is reporting that his right ankle hurts but won't not tell me exact location.  He otherwise denies pain anywhere else or any other injuries including head injury.  He shakes his head when asked if he has any weakness or sensory change.      REVIEW OF SYSTEMS  Please see HPI for pertinent positives/negatives.     PAST MEDICAL HISTORY  No past medical history on file.     SURGICAL HISTORY  No past surgical history on file.     SOCIAL HISTORY  Social History     Social History Main Topics   • Smoking status: Not on file   • Smokeless tobacco: Not on file   • Alcohol use Not on file   • Drug use: Unknown   • Sexual activity: Not on file        FAMILY HISTORY  No family history on file.     CURRENT MEDICATIONS  Home Medications    **Home medications have not yet been reviewed for this encounter**          ALLERGIES  Allergies not on file     PHYSICAL EXAM  VITAL SIGNS: /60   Pulse 75   Resp 16   SpO2 98%  @[email or web address][[id number]::@     Pulse ox interpretation: 98% I interpret this pulse ox as normal     Constitutional: Well developed, well nourished, sleeping in no apparent distress, nontoxic appearance   HENT: No external signs of trauma, normocephalic   Eyes: No discharge, no icterus   Neck: Trachea midline, no stridor   Cardiovascular: Strong distal pulses and good perfusion   Thorax & Lungs: No respiratory distress   Extremities: No cyanosis, no edema, no gross deformity, mild tenderness along lateral malleolus of right ankle, nontender to " palpation anywhere else including proximally or distally, no apparent laxity on stress testing but patient with discomfort, sensation intact to touch throughout, patient able to wiggle his toes, intact distal pulses with brisk cap refill   Skin: Warm, dry, no pallor/cyanosis, no rash noted   Neuro: Sleepy, no focal deficits noted   Psychiatric: Flat affect        DIAGNOSTIC STUDIES / PROCEDURES        LABS  All labs reviewed by me.     No results found for this or any previous visit.     RADIOLOGY  The radiologist's interpretation of all radiological studies have been reviewed by me.     DX-ANKLE 3+ VIEWS RIGHT   Final Result      No acute osseous abnormality.             COURSE & MEDICAL DECISION MAKING  Nursing notes, VS, PMSFHx reviewed in chart.     Review of past medical records shows the patient without previous visits to this ED.      Differential diagnoses considered include but are not limited to: Fx, subluxation, contusion, strain, sprain       Patient presents with his wife to the ED with above complaint.  X-rays of the right ankle without evidence of acute bony injuries.  Findings discussed with the patient.  Patient noted to be resting comfortably in no acute distress and nontoxic in appearance.  Discussed with patient that he likely has a right ankle sprain based on the history/exam/findings.  Patient was advised on home treatment including ice, elevation and using acetaminophen/ibuprofen as needed.  He was advised on outpatient follow-up and given return to ED precautions.  Patient was able to ambulate without difficulty in the ED.  Patient verbalized understanding and agreed with plan of care with no further questions or concerns.      The patient is referred to a primary physician for blood pressure management, diabetic screening, and for all other preventative health concerns.       FINAL IMPRESSION  1. Sprain of right ankle, unspecified ligament, initial encounter Acute           DISPOSITION  Patient will be discharged home in stable condition.       FOLLOW UP  [facility]  [address] [address]  [phone]  Call today      [facility], Emergency Dept  [address] [address] [phone]    If symptoms worsen         OUTPATIENT MEDICATIONS  There are no discharge medications for this patient.         Electronically signed by: Jeff Newby, 9/19/2018 5:42 AM      Portions of this record were made with voice recognition software.  Despite my review, spelling/grammar/context errors may still remain.  Interpretation of this chart should be taken in this context.

## 2018-09-19 NOTE — ED NOTES
The patient has been provided with discharge education and information.  The patient was also provided with instructions on follow up care and return precautions.  The patient verbalizes understanding of discharge instructions, follow up care, and return precautons.  All questions have been answered.  No RX written.  NAD, A/Ox4, good color and appropriate at time of discharge.  Patient ambulated out of department with steady gait.

## 2018-09-19 NOTE — ED NOTES
Please see down time paperwork and documentation from [id number] to include notes, assessment, orders, and vital signs.

## 2018-09-25 DIAGNOSIS — F41.9 ANXIETY: ICD-10-CM

## 2018-09-25 RX ORDER — ALPRAZOLAM 0.5 MG/1
0.5 TABLET ORAL NIGHTLY PRN
Qty: 5 TAB | Refills: 0
Start: 2018-09-25 | End: 2018-09-30

## 2018-09-28 ENCOUNTER — HOSPITAL ENCOUNTER (EMERGENCY)
Facility: MEDICAL CENTER | Age: 33
End: 2018-09-28
Attending: EMERGENCY MEDICINE
Payer: MEDICARE

## 2018-09-28 VITALS
HEIGHT: 67 IN | OXYGEN SATURATION: 100 % | TEMPERATURE: 97.5 F | HEART RATE: 71 BPM | BODY MASS INDEX: 27.47 KG/M2 | DIASTOLIC BLOOD PRESSURE: 86 MMHG | SYSTOLIC BLOOD PRESSURE: 120 MMHG | WEIGHT: 175 LBS | RESPIRATION RATE: 20 BRPM

## 2018-09-28 DIAGNOSIS — R41.82 ALTERED MENTAL STATUS, UNSPECIFIED ALTERED MENTAL STATUS TYPE: ICD-10-CM

## 2018-09-28 PROCEDURE — 99285 EMERGENCY DEPT VISIT HI MDM: CPT

## 2018-09-28 ASSESSMENT — ENCOUNTER SYMPTOMS
SHORTNESS OF BREATH: 0
FALLS: 0
VOMITING: 0
FEVER: 0

## 2018-09-28 ASSESSMENT — PAIN SCALES - GENERAL
PAINLEVEL_OUTOF10: 0
PAINLEVEL_OUTOF10: 0

## 2018-09-28 NOTE — ED NOTES
Discharge instructions reviewed, no questions at this time. Patient given outpatient addiction resources including [facility] and [facility]. Patient is strongly encouraged to use these resources.    Coffee and rest of sandwich sent with patient as well as EMS blanket.    Belongings returned to patient.    Patient ambulatory off unit.

## 2018-09-28 NOTE — ED PROVIDER NOTES
"ED Provider Note    ED Provider Note    Primary care provider: No primary care provider on file.  Means of arrival: EMS  History obtained from: EMS  History limited by: intoxication    CHIEF COMPLAINT  Chief Complaint   Patient presents with   • ALOC     found on sidewalk. pin point pupils.        HPI  Kyle Brewer is a 89 y.o. male who presents to the Emergency Department via EMS.  Apparently, at [facility], called because the patient was found with decreased responsiveness.  He was brought to the ED for evaluation.        REVIEW OF SYSTEMS  Review of Systems   Constitutional: Negative for fever.   Respiratory: Negative for shortness of breath.    Cardiovascular: Negative for chest pain.   Gastrointestinal: Negative for vomiting.   Musculoskeletal: Negative for falls.       PAST MEDICAL HISTORY   Denies any past medical history    SURGICAL HISTORY  patient denies any surgical history    SOCIAL HISTORY  Social History   Substance Use Topics   • Smoking status: Unknown If Ever Smoked   • Smokeless tobacco: Not on file   • Alcohol use Not on file      Comment: unk      History   Drug use: Unknown     Comment: found c pin point pupils & signs of ivda       FAMILY HISTORY  History reviewed. No pertinent family history.    CURRENT MEDICATIONS  Home Medications     Reviewed by Rahel Bender R.N. (Registered Nurse) on 09/28/18 at 0458  Med List Status: Unable to Obtain   Medication Last Dose Status        Patient Tomer Taking any Medications                       ALLERGIES  No Known Allergies    PHYSICAL EXAM  VITAL SIGNS: /86   Pulse 71   Temp 36.4 °C (97.5 °F)   Resp 20   Ht 1.702 m (5' 7\")   Wt 79.4 kg (175 lb)   SpO2 100%   BMI 27.41 kg/m²   Vitals reviewed.  Constitutional: Patient is oriented to person, place, and time. Appears well-developed and well-nourished.  Resting.  Appears comfortable.  Head: Normocephalic and atraumatic.   Ears: Normal external ears bilaterally.   Mouth/Throat: " Oropharynx is clear and moist  Eyes: Conjunctivae are normal. Pupils are equal, round, and reactive to light.   Neck: Normal range of motion. Neck supple.  Cardiovascular: Normal rate, regular rhythm and normal heart sounds. Normal peripheral pulses.  Pulmonary/Chest: Effort normal and breath sounds normal. No respiratory distress, no wheezes, rhonchi, or rales.   Abdominal: Soft. Bowel sounds are normal. No rebound or guarding, or peritoneal signs.  Musculoskeletal: No edema  Neurological: No focal deficits.   Skin: Skin is warm and dry. No erythema. No pallor.        COURSE & MEDICAL DECISION MAKING  Pertinent Labs & Imaging studies reviewed. (See chart for details)    Obtained and reviewed past medical records.  No prior records to review due to trauma name    5:14 AM - Patient seen and examined at bedside.  Signs of trauma.  He is in no distress.  He does arouse to voice as I enter the room but no other history is available.  EMS attempted IV establishment, unsuccessful.  Patient's has normal vital signs.  He is satting fine on room air.  He does have pinpoint pupils.  No other history available.  Suspect, intoxication, will allow the patient to sleep, will continue to monitor until he is more sober.    6:54 AM patient's reevaluated the bedside.  He still resting.  However, he is aware more awake, opens his eyes to voice says his name is Hamlet.  He has no complaints just that he wants to sleep.  We will allow him to continue to sober up before ambulating and disposition.    8:35 AM patient's reevaluated the bedside.  He is awake and alert, he is eating a boxed lunch.  He states he is not using drugs currently, he was just very tired.  States he is only had about 10 hours of sleep in the last 10 days secondary to being too busy.  States he has 10 children to live with his mother, 3 live with his sister.  For live with their mother and he did not account for the last child.  States he thinks he needs to be  worked up for narcolepsy.  He is overall well-appearing and nontoxic with normal vital signs.  I anticipate discharge to home after the patient finishes his snack.    FINAL IMPRESSION  1. Altered mental status, unspecified altered mental status type

## 2018-09-28 NOTE — ED NOTES
Pt biba for aloc. Pt answers only name upon exam. Placed on monitor. In sight of rn station. Vss. In nad. Pt has pinpoint pupils. Signs of piror ivda abuse. Tbs. Will ctm.

## 2018-09-28 NOTE — ED TRIAGE NOTES
Chief Complaint   Patient presents with   • ALOC     found on sidewalk. pin point pupils.    biba by ems. rbs 99. Alert to noxious stimuli. Vss.

## 2018-09-28 NOTE — ED NOTES
Attempted to arouse patient, able to rouse long enough to ask for breakfast, but goes back to sleep again and is difficult to arouse again. Turkey sandwich at bedside for when patient is able to eat.

## 2018-10-17 DIAGNOSIS — M51.36 DDD (DEGENERATIVE DISC DISEASE), LUMBAR: ICD-10-CM

## 2018-10-17 DIAGNOSIS — G43.809 OTHER MIGRAINE WITHOUT STATUS MIGRAINOSUS, NOT INTRACTABLE: ICD-10-CM

## 2018-10-18 RX ORDER — NAPROXEN 500 MG/1
TABLET ORAL
Qty: 180 TAB | Refills: 0 | Status: SHIPPED | OUTPATIENT
Start: 2018-10-18 | End: 2019-02-15 | Stop reason: CLARIF

## 2018-10-24 ENCOUNTER — HOSPITAL ENCOUNTER (EMERGENCY)
Dept: HOSPITAL 8 - ED | Age: 33
Discharge: HOME | End: 2018-10-24
Payer: MEDICARE

## 2018-10-24 VITALS — HEIGHT: 69 IN | WEIGHT: 181.99 LBS | BODY MASS INDEX: 26.96 KG/M2

## 2018-10-24 VITALS — DIASTOLIC BLOOD PRESSURE: 71 MMHG | SYSTOLIC BLOOD PRESSURE: 120 MMHG

## 2018-10-24 DIAGNOSIS — M72.2: Primary | ICD-10-CM

## 2018-10-24 DIAGNOSIS — J45.909: ICD-10-CM

## 2018-10-24 PROCEDURE — 99282 EMERGENCY DEPT VISIT SF MDM: CPT

## 2018-10-25 ENCOUNTER — HOSPITAL ENCOUNTER (OUTPATIENT)
Dept: LAB | Facility: MEDICAL CENTER | Age: 33
End: 2018-10-25
Attending: NURSE PRACTITIONER
Payer: MEDICARE

## 2018-10-25 ENCOUNTER — APPOINTMENT (OUTPATIENT)
Dept: RADIOLOGY | Facility: MEDICAL CENTER | Age: 33
End: 2018-10-25
Attending: EMERGENCY MEDICINE
Payer: MEDICARE

## 2018-10-25 ENCOUNTER — HOSPITAL ENCOUNTER (EMERGENCY)
Facility: MEDICAL CENTER | Age: 33
End: 2018-10-25
Attending: EMERGENCY MEDICINE
Payer: MEDICARE

## 2018-10-25 VITALS
TEMPERATURE: 98.4 F | WEIGHT: 179.9 LBS | BODY MASS INDEX: 28.18 KG/M2 | DIASTOLIC BLOOD PRESSURE: 82 MMHG | SYSTOLIC BLOOD PRESSURE: 136 MMHG | OXYGEN SATURATION: 94 % | RESPIRATION RATE: 15 BRPM | HEART RATE: 73 BPM

## 2018-10-25 DIAGNOSIS — F31.60 BIPOLAR DISORDER, MIXED (HCC): ICD-10-CM

## 2018-10-25 DIAGNOSIS — Z86.69 HX OF SEIZURE DISORDER: ICD-10-CM

## 2018-10-25 DIAGNOSIS — M79.661 PAIN OF RIGHT LOWER LEG: ICD-10-CM

## 2018-10-25 DIAGNOSIS — Z13.220 SCREENING CHOLESTEROL LEVEL: ICD-10-CM

## 2018-10-25 DIAGNOSIS — M25.572 ACUTE LEFT ANKLE PAIN: ICD-10-CM

## 2018-10-25 LAB
ALBUMIN SERPL BCP-MCNC: 4.3 G/DL (ref 3.2–4.9)
ALBUMIN/GLOB SERPL: 1.4 G/DL
ALP SERPL-CCNC: 56 U/L (ref 30–99)
ALT SERPL-CCNC: 57 U/L (ref 2–50)
ANION GAP SERPL CALC-SCNC: 6 MMOL/L (ref 0–11.9)
AST SERPL-CCNC: 39 U/L (ref 12–45)
BILIRUB SERPL-MCNC: 0.7 MG/DL (ref 0.1–1.5)
BUN SERPL-MCNC: 14 MG/DL (ref 8–22)
CALCIUM SERPL-MCNC: 9.6 MG/DL (ref 8.5–10.5)
CHLORIDE SERPL-SCNC: 104 MMOL/L (ref 96–112)
CHOLEST SERPL-MCNC: 173 MG/DL (ref 100–199)
CO2 SERPL-SCNC: 28 MMOL/L (ref 20–33)
CREAT SERPL-MCNC: 1.12 MG/DL (ref 0.5–1.4)
GLOBULIN SER CALC-MCNC: 3.1 G/DL (ref 1.9–3.5)
GLUCOSE SERPL-MCNC: 91 MG/DL (ref 65–99)
HDLC SERPL-MCNC: 74 MG/DL
LDLC SERPL CALC-MCNC: 87 MG/DL
POTASSIUM SERPL-SCNC: 3.9 MMOL/L (ref 3.6–5.5)
PROT SERPL-MCNC: 7.4 G/DL (ref 6–8.2)
SODIUM SERPL-SCNC: 138 MMOL/L (ref 135–145)
TRIGL SERPL-MCNC: 62 MG/DL (ref 0–149)
TSH SERPL DL<=0.005 MIU/L-ACNC: 0.95 UIU/ML (ref 0.38–5.33)

## 2018-10-25 PROCEDURE — 36415 COLL VENOUS BLD VENIPUNCTURE: CPT

## 2018-10-25 PROCEDURE — 73610 X-RAY EXAM OF ANKLE: CPT | Mod: LT

## 2018-10-25 PROCEDURE — 80061 LIPID PANEL: CPT | Mod: GA

## 2018-10-25 PROCEDURE — 99284 EMERGENCY DEPT VISIT MOD MDM: CPT

## 2018-10-25 PROCEDURE — 80053 COMPREHEN METABOLIC PANEL: CPT

## 2018-10-25 PROCEDURE — 93971 EXTREMITY STUDY: CPT | Mod: RT

## 2018-10-25 PROCEDURE — 700102 HCHG RX REV CODE 250 W/ 637 OVERRIDE(OP): Performed by: EMERGENCY MEDICINE

## 2018-10-25 PROCEDURE — 84443 ASSAY THYROID STIM HORMONE: CPT

## 2018-10-25 PROCEDURE — A9270 NON-COVERED ITEM OR SERVICE: HCPCS | Performed by: EMERGENCY MEDICINE

## 2018-10-25 RX ORDER — GABAPENTIN 400 MG/1
400 CAPSULE ORAL ONCE
Status: COMPLETED | OUTPATIENT
Start: 2018-10-25 | End: 2018-10-25

## 2018-10-25 RX ADMIN — GABAPENTIN 400 MG: 400 CAPSULE ORAL at 02:00

## 2018-10-25 ASSESSMENT — LIFESTYLE VARIABLES: DO YOU DRINK ALCOHOL: NO

## 2018-10-25 ASSESSMENT — PAIN SCALES - GENERAL: PAINLEVEL_OUTOF10: 9

## 2018-10-25 NOTE — ED TRIAGE NOTES
Hamlet Maximino Islas  33 y.o. male  Chief Complaint   Patient presents with   • Leg Pain     Pt. states chronic right leg pain due to tendonitis and plantar fascitis. Pt. states hx of scoliosis as well all contributing to pain in his right leg pain. Pt. states this pain he thinks is probably due to overexertion. Pt. states however the pain is so bad it is now hard for him to walk on his right leg.     /83   Pulse 92   Temp 36.4 °C (97.6 °F)   Resp 19   Wt 81.6 kg (179 lb 14.3 oz)   SpO2 99%   BMI 28.18 kg/m²     Pt amb to triage with steady gait for above complaint.   Pt is alert and oriented, speaking in full sentences, follows commands and responds appropriately to questions. NAD. Resp are even and unlabored.  Pt placed in lobby. Pt educated on triage process. Pt encouraged to alert staff for any changes.

## 2018-10-25 NOTE — ED PROVIDER NOTES
ED Provider Note    CHIEF COMPLAINT  Chief Complaint   Patient presents with   • Leg Pain     Pt. states chronic right leg pain due to tendonitis and plantar fascitis. Pt. states hx of scoliosis as well all contributing to pain in his right leg pain. Pt. states this pain he thinks is probably due to overexertion. Pt. states however the pain is so bad it is now hard for him to walk on his right leg.        HPI    Primary care provider: NANCY Cartagena   History obtained from: Patient  History limited by: None     Hamlet Islas is a 33 y.o. male who presents to the ED complaining of pain to his right lower leg radiating down to his right plantar surface that has been worsening over the past several days because he has been walking a lot.  Patient states that he has walked about 14 miles over the past 3 days because his car broke down.  He denies any injury/trauma.  He does have history of tendinitis and plantar fasciitis.  He also reports history of scoliosis which makes him favor one side when he walks and perhaps making his pain worse.  Patient also states he has fibromyalgia and wants to catch his pain before it gets worse.  He otherwise denies pain anywhere else.  He denies any fever/shortness breath or difficulty breathing.  He does get nausea at times and has been using his Phenergan.  There is no weakness or sensory change.  He denies diarrhea/constipation/dysuria/incontinence.  He has been using naproxen without any improvement.  Patient states that he came to the ED because the pain was making it difficult for him to walk tonight.  He denies any history of blood clots.      REVIEW OF SYSTEMS  Please see HPI for pertinent positives/negatives.     PAST MEDICAL HISTORY  Past Medical History:   Diagnosis Date   • Chronic hip pain 9/8/2014   • Scoliosis 9/8/2014   • Bipolar disorder with depression (HCC) 9/8/2014   • Asthma    • Bipolar disorder (HCC)    • Chronic low back pain    • Migraine    •  Neuropathy (HCC)    • Obsessive compulsive disorder    • Post traumatic stress disorder (PTSD)    • Psoriasis    • Schizoaffective disorder (HCC)    • Tendinitis of foot     left foot, chronic        SURGICAL HISTORY  Past Surgical History:   Procedure Laterality Date   • OTHER      oral        SOCIAL HISTORY  Social History     Social History Main Topics   • Smoking status: Unknown If Ever Smoked   • Smokeless tobacco: Never Used   • Alcohol use No      Comment: unk   • Drug use: Yes     Types: Intravenous, Inhaled   • Sexual activity: Yes     Partners: Female        FAMILY HISTORY  Family History   Problem Relation Age of Onset   • Other Mother         discoid lupus   • Cancer Mother         leukemia   • Hyperlipidemia Mother    • Hypertension Mother    • Cancer Father         lung        CURRENT MEDICATIONS  Home Medications     Reviewed by Beverly Izquierdo R.N. (Registered Nurse) on 10/25/18 at 0115  Med List Status: Partial   Medication Last Dose Status   levETIRAcetam (KEPPRA) 500 MG Tab  Active   lidocaine (LIDODERM) 5 % Patch 8/29/2018 Active   meloxicam (MOBIC) 7.5 MG Tab  Active   naproxen (NAPROSYN) 500 MG Tab  Active   promethazine (PHENERGAN) 25 MG Tab  Active   risperiDONE (RISPERDAL) 3 MG Tab  Active   tizanidine (ZANAFLEX) 4 MG capsule 8/29/2018 Active                 ALLERGIES  No Known Allergies     PHYSICAL EXAM  VITAL SIGNS: /73   Pulse 72   Temp 36.4 °C (97.6 °F)   Resp 18   Wt 81.6 kg (179 lb 14.3 oz)   SpO2 97%   BMI 28.18 kg/m²  @[email or web address][[id number]::@     Pulse ox interpretation: 99% I interpret this pulse ox as normal     Constitutional: Well developed, well nourished, alert in no apparent distress, nontoxic appearance    HENT: No external signs of trauma, normocephalic, oropharynx moist and clear, nose normal    Eyes: PERRL, conjunctiva without erythema, no discharge, no icterus    Neck: Soft and supple, trachea midline, no stridor, no tenderness, no LAD, no JVD, good ROM     Cardiovascular: Regular rate and rhythm, no murmurs/rubs/gallops, strong distal pulses and good perfusion    Thorax & Lungs: No respiratory distress, CTAB   Abdomen: Soft, nontender, nondistended, no guarding, no rebound, normal BS    Back: No CVAT    Extremities: No cyanosis, no edema, no gross deformity, good ROM, diffuse tenderness to palpation of right lower leg without gross deformity/swelling/rash/warmth/crepitus/palpable cords, intact distal pulses with brisk cap refill    Skin: Warm, dry, no pallor/cyanosis, no rash noted    Lymphatic: No lymphadenopathy noted    Neuro: A/O times 3, no focal deficits noted    Psychiatric: Cooperative, no signs of active hallucinations or delusions        DIAGNOSTIC STUDIES / PROCEDURES        LABS  All labs reviewed by me.     Results for orders placed or performed during the hospital encounter of 03/31/16   COMP METABOLIC PANEL   Result Value Ref Range    Sodium 138 135 - 145 mmol/L    Potassium 3.7 3.6 - 5.5 mmol/L    Chloride 104 96 - 112 mmol/L    Co2 26 20 - 33 mmol/L    Anion Gap 8.0 0.0 - 11.9    Glucose 77 65 - 99 mg/dL    Bun 10 8 - 22 mg/dL    Creatinine 1.01 0.50 - 1.40 mg/dL    Calcium 9.6 8.5 - 10.5 mg/dL    AST(SGOT) 29 12 - 45 U/L    ALT(SGPT) 42 2 - 50 U/L    Alkaline Phosphatase 77 30 - 99 U/L    Total Bilirubin 0.9 0.1 - 1.5 mg/dL    Albumin 4.2 3.2 - 4.9 g/dL    Total Protein 7.3 6.0 - 8.2 g/dL    Globulin 3.1 1.9 - 3.5 g/dL    A-G Ratio 1.4 g/dL   URINE DRUG SCREEN W/CONF (AR)   Result Value Ref Range    Urine Amphetamine-Methamphetam Negative Cutoff 300 ng/mL    Barbiturates Negative Cutoff 200 ng/mL    Benzodiazepines Negative Cutoff 200 ng/mL    Propoxyphene Negative Cutoff 300 ng/mL    Cocaine Metabolite Negative Cutoff 150 ng/mL    Methadone Negative Cutoff 150 ng/mL    Codeine-Morphine Negative Cutoff 300 ng/mL    Phencyclidine -Pcp Negative Cutoff 25 ng/mL    Cannabinoid Metab Negative Cutoff 20 ng/mL    Drug Comment Urine Drugs See Note     ESTIMATED GFR   Result Value Ref Range    GFR If African American >60 >60 mL/min/1.73 m 2    GFR If Non African American >60 >60 mL/min/1.73 m 2        RADIOLOGY  The radiologist's interpretation of all radiological studies have been reviewed by me.     DX-ANKLE 3+ VIEWS LEFT   Final Result      No bony abnormality.      US-EXTREMITY VENOUS LOWER UNILAT RIGHT                COURSE & MEDICAL DECISION MAKING  Nursing notes, VS, PMSFHx reviewed in chart.     Review of past medical records shows the patient was last seen in this ED September 28, 2018 after he was found with altered mental status on the sidewalk.      Differential diagnoses considered include but are not limited to: Arthritis, bursitis, tendonitis, DVT/vascular occlusion, radiculopathy, strain/sprain, neuropathy       Patient presents to the ED with above complaint.  Right lower extremity Doppler ultrasound without evidence of DVT.  While in the ED, patient also complained of left ankle pain and therefore ankle x-ray was performed without evidence of acute bony injury.  Patient was given gabapentin for his pain during his ED stay.  I discussed the findings with the patient.  Patient noted to be resting comfortably in no acute distress, nontoxic in appearance.  Low clinical suspicion for more serious acute pathology such as sepsis, compartment syndrome, necrotizing fasciitis, osteomyelitis, cellulitis, abscess, DVT, arterial occlusion given the history/exam/findings.  Discussed with patient his symptoms are likely strain/sprain and he was advised on using ice/heat and acetaminophen/ibuprofen as needed.  He was also noted to have borderline elevated blood pressure and will need outpatient follow-up for further management.  Return to ED precautions were given.  Patient verbalized understanding and agreed with plan of care with no further questions or concerns.      The patient is referred to a primary physician for blood pressure management, diabetic  screening, and for all other preventative health concerns.       FINAL IMPRESSION  1. Pain of right lower leg Acute   2. Acute left ankle pain Acute          DISPOSITION  Patient will be discharged home in stable condition.       FOLLOW UP  NANCY Cartagena  [address] [facility] [address]  [facility] [id number] [phone]    Call today      [facility], Emergency Dept  [address] [facility] [id number] [phone]    If symptoms worsen         OUTPATIENT MEDICATIONS  New Prescriptions    No medications on file          Electronically signed by: Jeff Newby, 10/25/2018 1:34 AM      Portions of this record were made with voice recognition software.  Despite my review, spelling/grammar/context errors may still remain.  Interpretation of this chart should be taken in this context.

## 2018-11-29 ENCOUNTER — HOSPITAL ENCOUNTER (EMERGENCY)
Facility: MEDICAL CENTER | Age: 33
End: 2018-11-29
Attending: EMERGENCY MEDICINE
Payer: MEDICARE

## 2018-11-29 VITALS
SYSTOLIC BLOOD PRESSURE: 122 MMHG | DIASTOLIC BLOOD PRESSURE: 83 MMHG | HEIGHT: 70 IN | RESPIRATION RATE: 16 BRPM | TEMPERATURE: 97.7 F | WEIGHT: 180.12 LBS | BODY MASS INDEX: 25.79 KG/M2 | OXYGEN SATURATION: 97 % | HEART RATE: 82 BPM

## 2018-11-29 DIAGNOSIS — L08.9 WOUND INFECTION: ICD-10-CM

## 2018-11-29 DIAGNOSIS — T14.8XXA WOUND INFECTION: ICD-10-CM

## 2018-11-29 DIAGNOSIS — F12.10 MARIJUANA ABUSE: ICD-10-CM

## 2018-11-29 DIAGNOSIS — F42.4 SKIN PICKING HABIT: ICD-10-CM

## 2018-11-29 DIAGNOSIS — F15.10 METHAMPHETAMINE ABUSE (HCC): ICD-10-CM

## 2018-11-29 PROCEDURE — 99284 EMERGENCY DEPT VISIT MOD MDM: CPT

## 2018-11-29 RX ORDER — CEPHALEXIN 500 MG/1
500 CAPSULE ORAL 4 TIMES DAILY
Qty: 20 CAP | Refills: 0 | Status: SHIPPED | OUTPATIENT
Start: 2018-11-29 | End: 2018-12-04

## 2018-11-29 ASSESSMENT — PAIN SCALES - WONG BAKER: WONGBAKER_NUMERICALRESPONSE: HURTS A LITTLE MORE

## 2018-11-29 ASSESSMENT — LIFESTYLE VARIABLES: DO YOU DRINK ALCOHOL: NO

## 2018-11-29 ASSESSMENT — PAIN SCALES - GENERAL
PAINLEVEL_OUTOF10: 4
PAINLEVEL_OUTOF10: 4

## 2018-11-30 NOTE — ED PROVIDER NOTES
ED Provider Note    Scribed for Jax Ward M.D. by Stuart Apple. 11/29/2018, 11:16 PM.    Primary care provider: NANCY Cartagena  Means of arrival: Walk-in  History obtained from: Patient  History limited by: None    CHIEF COMPLAINT  Chief Complaint   Patient presents with   • Wound Check     Pt has old circular wounds on L hand and R thigh that he would like a doctor to check out. Hx of MRSA. No drainage, redness, or swelling noted.       HPI  Hamlet Islas is a 33 y.o. male who presents to the Emergency Department complaining of small circular wounds on his left hand and right thigh. He states they have been there for the past few weeks and have worsened over the past 3 days. He states he smokes about 0.5 packs of cigarettes per day. He admits to current methamphetamine and marijuana use, as well as past cocaine use. He was diagnosed with MRSA in May. His PCP is BASHIR Kennedy.    REVIEW OF SYSTEMS  See HPI for further details.    PAST MEDICAL HISTORY   has a past medical history of Asthma; Bipolar disorder ([facility]); Bipolar disorder with depression (HCC) (9/8/2014); Chronic hip pain (9/8/2014); Chronic low back pain; Migraine; Neuropathy ([facility]); Obsessive compulsive disorder; Post traumatic stress disorder (PTSD); Psoriasis; Schizoaffective disorder (HCC); Scoliosis (9/8/2014); and Tendinitis of foot.    SURGICAL HISTORY   has a past surgical history that includes other.    SOCIAL HISTORY  Social History   Substance Use Topics   • Smoking status: Current Every Day Smoker     Packs/day: 0.50     Types: Cigarettes   • Smokeless tobacco: Never Used   • Alcohol use Yes      Comment: rare      History   Drug Use: Admits to current meth and marijuana use. Past cocaine use.       FAMILY HISTORY  Family History   Problem Relation Age of Onset   • Other Mother         discoid lupus   • Cancer Mother         leukemia   • Hyperlipidemia Mother    • Hypertension Mother    • Cancer Father         lung  "      CURRENT MEDICATIONS  Reviewed.  See Encounter Summary.     ALLERGIES  No Known Allergies    PHYSICAL EXAM  VITAL SIGNS: /83   Pulse 82   Temp 36.5 °C (97.7 °F) (Temporal)   Resp 16   Ht 1.778 m (5' 10\")   Wt 81.7 kg (180 lb 1.9 oz)   SpO2 97%   BMI 25.84 kg/m²   Constitutional: Alert in no apparent distress.  HENT: No signs of trauma, Bilateral external ears normal, Nose normal.   Eyes: Pupils are equal and reactive, Conjunctiva normal, Non-icteric.   Neck: Normal range of motion, No tenderness, Supple, No stridor.   Lymphatic: No lymphadenopathy noted.   Cardiovascular: Regular rate and rhythm, no murmurs.   Thorax & Lungs: Normal breath sounds, No respiratory distress, No wheezing, No chest tenderness.   Abdomen: Bowel sounds normal, Soft, No tenderness, No masses, No pulsatile masses. No peritoneal signs.  Skin: 6 discrete, superficial, round lesions to right thigh, consistent with skin picking. No evidence of abscess. Insignificant cellulitis.    Extremities: Intact distal pulses, No edema, No tenderness, No cyanosis  Musculoskeletal: Good range of motion in all major joints. No tenderness to palpation or major deformities noted.   Neurologic: Alert , Normal motor function, Normal sensory function, No focal deficits noted.   Psychiatric: Poor eye contact. Admits to meth.    COURSE & MEDICAL DECISION MAKING  Pertinent Labs & Imaging studies reviewed. (See chart for details)    11:16 PM - Patient seen and examined at bedside. I explained that his wounds are consistent with skin picking secondary to drug use. He does not require any labs or imaging at this time. Patient is stable for discharge at this time with prescription for Keflex. I recommended seeking help for stopping drug use. Patient instructed to follow up with primary care and given strict return precautions with any new or worsening symptoms. Patient understands and agrees to plan of care and discharge at this time.       Decision " Making:  This is a 33 y.o. year old male who presents with above complaint.  Patient does admit to methamphetamine abuse and admits to chronic skin picking.  His dermal lesions appeared consistent with this.  In fact he has ongoing behavior even during our exam.  I will empirically start him on antibiotics and have him follow-up at local clinic for outpatient follow-up.    The patient will return for new or worsening symptoms and is stable at the time of discharge.    The patient is referred to a primary physician for blood pressure management, diabetic screening, and for all other preventative health concerns.    DISPOSITION:  Patient will be discharged home in stable condition.    FOLLOW UP:  No follow-up provider specified.    OUTPATIENT MEDICATIONS:  Discharge Medication List as of 11/29/2018 11:28 PM      START taking these medications    Details   cephALEXin (KEFLEX) 500 MG Cap Take 1 Cap by mouth 4 times a day for 5 days., Disp-20 Cap, R-0, Print Rx Paper              FINAL IMPRESSION  1. Wound infection    2. Skin picking habit    3. Methamphetamine abuse (HCC)    4. Marijuana abuse          IStuart (Scribe), am scribing for, and in the presence of, Jax Ward M.D..    Electronically signed by: Stuart Apple (Scribe), 11/29/2018    IJax M.D. personally performed the services described in this documentation, as scribed by Stuart Apple in my presence, and it is both accurate and complete. E.    The note accurately reflects work and decisions made by me.  Jax Ward  11/30/2018  1:01 AM

## 2018-11-30 NOTE — ED TRIAGE NOTES
"Chief Complaint   Patient presents with   • Wound Check     Pt has old circular wounds on L hand and R thigh that he would like a doctor to check out. Hx of MRSA. No drainage, redness, or swelling noted.     /83   Pulse 82   Temp 36.5 °C (97.7 °F) (Temporal)   Resp 16   Ht 1.778 m (5' 10\")   Wt 81.7 kg (180 lb 1.9 oz)   SpO2 97%   BMI 25.84 kg/m²     Pt ambulated into triage, complaints as above. VS as above, NAD, encouraged to return to the triage nurse or tech with any new complaints or symptoms.  "

## 2018-12-02 ENCOUNTER — HOSPITAL ENCOUNTER (EMERGENCY)
Facility: MEDICAL CENTER | Age: 33
End: 2018-12-02
Attending: EMERGENCY MEDICINE
Payer: MEDICARE

## 2018-12-02 VITALS
TEMPERATURE: 99.5 F | HEART RATE: 76 BPM | OXYGEN SATURATION: 98 % | RESPIRATION RATE: 16 BRPM | WEIGHT: 180.78 LBS | BODY MASS INDEX: 25.88 KG/M2 | DIASTOLIC BLOOD PRESSURE: 57 MMHG | SYSTOLIC BLOOD PRESSURE: 101 MMHG | HEIGHT: 70 IN

## 2018-12-02 DIAGNOSIS — R11.2 NON-INTRACTABLE VOMITING WITH NAUSEA, UNSPECIFIED VOMITING TYPE: ICD-10-CM

## 2018-12-02 DIAGNOSIS — E86.0 DEHYDRATION: ICD-10-CM

## 2018-12-02 DIAGNOSIS — F19.10 POLYSUBSTANCE ABUSE (HCC): ICD-10-CM

## 2018-12-02 DIAGNOSIS — R51.9 ACUTE NONINTRACTABLE HEADACHE, UNSPECIFIED HEADACHE TYPE: ICD-10-CM

## 2018-12-02 LAB
ALBUMIN SERPL BCP-MCNC: 3.5 G/DL (ref 3.2–4.9)
ALBUMIN/GLOB SERPL: 1.2 G/DL
ALP SERPL-CCNC: 62 U/L (ref 30–99)
ALT SERPL-CCNC: 18 U/L (ref 2–50)
ANION GAP SERPL CALC-SCNC: 7 MMOL/L (ref 0–11.9)
AST SERPL-CCNC: 28 U/L (ref 12–45)
BASOPHILS # BLD AUTO: 0.9 % (ref 0–1.8)
BASOPHILS # BLD: 0.05 K/UL (ref 0–0.12)
BILIRUB SERPL-MCNC: 0.9 MG/DL (ref 0.1–1.5)
BUN SERPL-MCNC: 8 MG/DL (ref 8–22)
CALCIUM SERPL-MCNC: 8.2 MG/DL (ref 8.5–10.5)
CHLORIDE SERPL-SCNC: 103 MMOL/L (ref 96–112)
CO2 SERPL-SCNC: 25 MMOL/L (ref 20–33)
CREAT SERPL-MCNC: 1.14 MG/DL (ref 0.5–1.4)
EOSINOPHIL # BLD AUTO: 0.02 K/UL (ref 0–0.51)
EOSINOPHIL NFR BLD: 0.4 % (ref 0–6.9)
ERYTHROCYTE [DISTWIDTH] IN BLOOD BY AUTOMATED COUNT: 41.6 FL (ref 35.9–50)
GLOBULIN SER CALC-MCNC: 2.9 G/DL (ref 1.9–3.5)
GLUCOSE SERPL-MCNC: 115 MG/DL (ref 65–99)
HCT VFR BLD AUTO: 42.8 % (ref 42–52)
HGB BLD-MCNC: 14.9 G/DL (ref 14–18)
IMM GRANULOCYTES # BLD AUTO: 0.05 K/UL (ref 0–0.11)
IMM GRANULOCYTES NFR BLD AUTO: 0.9 % (ref 0–0.9)
LIPASE SERPL-CCNC: 9 U/L (ref 11–82)
LYMPHOCYTES # BLD AUTO: 1.88 K/UL (ref 1–4.8)
LYMPHOCYTES NFR BLD: 33.8 % (ref 22–41)
MCH RBC QN AUTO: 31.3 PG (ref 27–33)
MCHC RBC AUTO-ENTMCNC: 34.8 G/DL (ref 33.7–35.3)
MCV RBC AUTO: 89.9 FL (ref 81.4–97.8)
MONOCYTES # BLD AUTO: 1.22 K/UL (ref 0–0.85)
MONOCYTES NFR BLD AUTO: 21.9 % (ref 0–13.4)
NEUTROPHILS # BLD AUTO: 2.34 K/UL (ref 1.82–7.42)
NEUTROPHILS NFR BLD: 42.1 % (ref 44–72)
NRBC # BLD AUTO: 0 K/UL
NRBC BLD-RTO: 0 /100 WBC
PLATELET # BLD AUTO: 316 K/UL (ref 164–446)
PMV BLD AUTO: 8.5 FL (ref 9–12.9)
POTASSIUM SERPL-SCNC: 3.7 MMOL/L (ref 3.6–5.5)
PROT SERPL-MCNC: 6.4 G/DL (ref 6–8.2)
RBC # BLD AUTO: 4.76 M/UL (ref 4.7–6.1)
SODIUM SERPL-SCNC: 135 MMOL/L (ref 135–145)
WBC # BLD AUTO: 5.6 K/UL (ref 4.8–10.8)

## 2018-12-02 PROCEDURE — 80053 COMPREHEN METABOLIC PANEL: CPT

## 2018-12-02 PROCEDURE — 700105 HCHG RX REV CODE 258: Performed by: EMERGENCY MEDICINE

## 2018-12-02 PROCEDURE — 99285 EMERGENCY DEPT VISIT HI MDM: CPT

## 2018-12-02 PROCEDURE — 85025 COMPLETE CBC W/AUTO DIFF WBC: CPT

## 2018-12-02 PROCEDURE — 700111 HCHG RX REV CODE 636 W/ 250 OVERRIDE (IP): Performed by: EMERGENCY MEDICINE

## 2018-12-02 PROCEDURE — 700102 HCHG RX REV CODE 250 W/ 637 OVERRIDE(OP): Performed by: EMERGENCY MEDICINE

## 2018-12-02 PROCEDURE — A9270 NON-COVERED ITEM OR SERVICE: HCPCS | Performed by: EMERGENCY MEDICINE

## 2018-12-02 PROCEDURE — 36415 COLL VENOUS BLD VENIPUNCTURE: CPT

## 2018-12-02 PROCEDURE — 83690 ASSAY OF LIPASE: CPT

## 2018-12-02 PROCEDURE — 96374 THER/PROPH/DIAG INJ IV PUSH: CPT

## 2018-12-02 RX ORDER — IBUPROFEN 600 MG/1
600 TABLET ORAL ONCE
Status: COMPLETED | OUTPATIENT
Start: 2018-12-02 | End: 2018-12-02

## 2018-12-02 RX ORDER — SODIUM CHLORIDE 9 MG/ML
1000 INJECTION, SOLUTION INTRAVENOUS ONCE
Status: COMPLETED | OUTPATIENT
Start: 2018-12-02 | End: 2018-12-02

## 2018-12-02 RX ORDER — HALOPERIDOL 5 MG/ML
2.5 INJECTION INTRAMUSCULAR ONCE
Status: COMPLETED | OUTPATIENT
Start: 2018-12-02 | End: 2018-12-02

## 2018-12-02 RX ORDER — LEVETIRACETAM 500 MG/1
500 TABLET ORAL ONCE
Status: COMPLETED | OUTPATIENT
Start: 2018-12-02 | End: 2018-12-02

## 2018-12-02 RX ADMIN — HALOPERIDOL LACTATE 2.5 MG: 5 INJECTION, SOLUTION INTRAMUSCULAR at 01:07

## 2018-12-02 RX ADMIN — SODIUM CHLORIDE 1000 ML: 9 INJECTION, SOLUTION INTRAVENOUS at 01:07

## 2018-12-02 RX ADMIN — LEVETIRACETAM 500 MG: 500 TABLET, FILM COATED ORAL at 01:07

## 2018-12-02 RX ADMIN — IBUPROFEN 600 MG: 600 TABLET, FILM COATED ORAL at 01:07

## 2018-12-02 ASSESSMENT — PAIN SCALES - GENERAL
PAINLEVEL_OUTOF10: 9
PAINLEVEL_OUTOF10: 6

## 2018-12-02 NOTE — DISCHARGE INSTRUCTIONS
You were seen and evaluated in the Emergency Department at [facility] for:     Headache.     You had the following tests and studies:    Blood tests are stable.     You received the following medications:    Keppra, haloperidol    ----------------------------    Please make sure to follow up with:    Your primary doctor for recheck and routine health care, but please come right back to the ER if you get ANY worse! Specifically watch for worse headaches, fevers, vomiting, vision changes, or any other concerns.     Good luck!  ----------------------------    We always encourage patients to return IMMEDIATELY if they have:  Increased or changing pain, passing out, fevers over 100.4 (taken in your mouth or rectally) for more than 2 days, redness or swelling of skin or tissues, feeling like your heart is beating fast, chest pain that is new or worsening, trouble breathing, feeling like your throat is closing up and can not breath, inability to walk, weakness of any part of your body, new dizziness, severe bleeding that won't stop from any part of your body, if you can't eat or drink, or if you have any other concerns.   If you feel worse, please know that you can always return with any questions, concerns, worse symptoms, or you are feeling unsafe. We certainly cannot say for sure that we have ruled out every illness or dangerous disease, but we feel that at this specific time, your exam, tests, and vital signs like heart rate and blood pressure are safe for discharge.

## 2018-12-02 NOTE — ED PROVIDER NOTES
"ED PROVIDER NOTE     Scribed for Antoni Bacon M.D. by Vinod Zarate. 12/2/2018, 12:45 AM.    CHIEF COMPLAINT  Chief Complaint   Patient presents with   • Seizure     PT states he currently has migraine with nausea and vomitting.  HE states he is concerned he is going to have seizure due to \"aura\" like symptoms and his h/o seizure when detoxing from meth.  Also has h/o seizure d/o and takes keppra for it, but is out of meds for the last week.  Last meth use was 48 hours ago.   • Migraine       HPI    Primary care provider: NANCY Cartagena  Means of arrival: Ambulance  History obtained from: Patient  History limited by: None    Hamlet Islas is a 33 y.o. male who presents with a frontal migraine onset yesterday that has progressively increased in severity.  No thunderclap onset.  He endorses associated photophobia, nausea, and vomiting. The patient reports no recent injury or head trauma. He denies fever. The patient is concerned he may have a seizure due to \"aura\" like symptoms with history of seizures when detoxing from meth. He is currently on Keprra, but states he has not taken the medication for the past 5 days due to financial issues. The patient reports he last abused meth 48 hours ago. No alleviating or exacerbating factors are identified at this time.  Many prior episodes very similar to this.  This is not the worst headache of his.     REVIEW OF SYSTEMS  Constitutional: Negative for fever.  Eyes: Positive for photophobia.   Gastrointestinal: Positive for nausea and vomiting.  Neurological: Positive for migraine.  All other systems reviewed and are negative.    PAST MEDICAL HISTORY   has a past medical history of Asthma; Bipolar disorder (HCC); Bipolar disorder with depression (HCC) (9/8/2014); Chronic hip pain (9/8/2014); Chronic low back pain; Migraine; Neuropathy (HCC); Obsessive compulsive disorder; Post traumatic stress disorder (PTSD); Psoriasis; Schizoaffective disorder (HCC); " "Scoliosis (9/8/2014); and Tendinitis of foot.    PAST FAMILY HISTORY  Family History   Problem Relation Age of Onset   • Other Mother         discoid lupus   • Cancer Mother         leukemia   • Hyperlipidemia Mother    • Hypertension Mother    • Cancer Father         lung       SOCIAL HISTORY  Social History     Social History Main Topics   • Smoking status: Current Every Day Smoker     Packs/day: 0.50     Types: Cigarettes   • Smokeless tobacco: Never Used   • Alcohol use Yes      Comment: rare   • Drug use: Yes      Comment: meth and marijuana   • Sexual activity: Yes     Partners: Female       SURGICAL HISTORY  Patient denies any major surgical history    CURRENT MEDICATIONS  Home Medications     Reviewed by Santa Kennedy R.N. (Registered Nurse) on 12/02/18 at 0041  Med List Status: Partial   Medication Last Dose Status   cephALEXin (KEFLEX) 500 MG Cap not taking Active   levETIRAcetam (KEPPRA) 500 MG Tab ran out Active   lidocaine (LIDODERM) 5 % Patch not taking Active   meloxicam (MOBIC) 7.5 MG Tab not taking Active   naproxen (NAPROSYN) 500 MG Tab not taking Active   promethazine (PHENERGAN) 25 MG Tab not taking Active   risperiDONE (RISPERDAL) 3 MG Tab not taking Active   tizanidine (ZANAFLEX) 4 MG capsule not taking Active                ALLERGIES  No Known Allergies    PHYSICAL EXAM  VITAL SIGNS: /57   Pulse 81   Temp 37.6 °C (99.7 °F) (Temporal)   Resp 16   Ht 1.778 m (5' 10\")   Wt 82 kg (180 lb 12.4 oz)   SpO2 93%   BMI 25.94 kg/m²    Pulse ox interpretation: On room air, I interpret this pulse ox as normal.  Constitutional: Lying on stretcher in mild distress.   HEENT: Normocephalic, atraumatic. Posterior pharynx clear, mucous membranes dry. Edentulous.  Eyes:  EOMI. Normal sclera.  Neck: Supple, Full range of motion, nontender.  Chest/Pulmonary: Clear to ausculation bilaterally, no wheezes or rhonchi.  Cardiovascular: Regular rate and rhythm, no murmur.   Abdomen: Soft, nontender, no " rebound, guarding, or masses.  Back: No CVA tenderness, nontender midline, no step offs.  Musculoskeletal: No deformity, no edema.  Neuro: Clear speech, normal coordination, cranial nerves II-XII grossly intact.  Normal strength and sensation.   Psych: Flat affect but cooperative.  Skin: No rashes, warm and dry. Left hand with scab, no surrounding erythema or pus.     DIAGNOSTIC STUDIES / PROCEDURES    LABS & EKG  Results for orders placed or performed during the hospital encounter of 12/02/18   CBC WITH DIFFERENTIAL   Result Value Ref Range    WBC 5.6 4.8 - 10.8 K/uL    RBC 4.76 4.70 - 6.10 M/uL    Hemoglobin 14.9 14.0 - 18.0 g/dL    Hematocrit 42.8 42.0 - 52.0 %    MCV 89.9 81.4 - 97.8 fL    MCH 31.3 27.0 - 33.0 pg    MCHC 34.8 33.7 - 35.3 g/dL    RDW 41.6 35.9 - 50.0 fL    Platelet Count 316 164 - 446 K/uL    MPV 8.5 (L) 9.0 - 12.9 fL    Neutrophils-Polys 42.10 (L) 44.00 - 72.00 %    Lymphocytes 33.80 22.00 - 41.00 %    Monocytes 21.90 (H) 0.00 - 13.40 %    Eosinophils 0.40 0.00 - 6.90 %    Basophils 0.90 0.00 - 1.80 %    Immature Granulocytes 0.90 0.00 - 0.90 %    Nucleated RBC 0.00 /100 WBC    Neutrophils (Absolute) 2.34 1.82 - 7.42 K/uL    Lymphs (Absolute) 1.88 1.00 - 4.80 K/uL    Monos (Absolute) 1.22 (H) 0.00 - 0.85 K/uL    Eos (Absolute) 0.02 0.00 - 0.51 K/uL    Baso (Absolute) 0.05 0.00 - 0.12 K/uL    Immature Granulocytes (abs) 0.05 0.00 - 0.11 K/uL    NRBC (Absolute) 0.00 K/uL   CMP   Result Value Ref Range    Sodium 135 135 - 145 mmol/L    Potassium 3.7 3.6 - 5.5 mmol/L    Chloride 103 96 - 112 mmol/L    Co2 25 20 - 33 mmol/L    Anion Gap 7.0 0.0 - 11.9    Glucose 115 (H) 65 - 99 mg/dL    Bun 8 8 - 22 mg/dL    Creatinine 1.14 0.50 - 1.40 mg/dL    Calcium 8.2 (L) 8.5 - 10.5 mg/dL    AST(SGOT) 28 12 - 45 U/L    ALT(SGPT) 18 2 - 50 U/L    Alkaline Phosphatase 62 30 - 99 U/L    Total Bilirubin 0.9 0.1 - 1.5 mg/dL    Albumin 3.5 3.2 - 4.9 g/dL    Total Protein 6.4 6.0 - 8.2 g/dL    Globulin 2.9 1.9 - 3.5  g/dL    A-G Ratio 1.2 g/dL   LIPASE   Result Value Ref Range    Lipase 9 (L) 11 - 82 U/L   ESTIMATED GFR   Result Value Ref Range    GFR If African American >60 >60 mL/min/1.73 m 2    GFR If Non African American >60 >60 mL/min/1.73 m 2       All labs reviewed by me.    COURSE & MEDICAL DECISION MAKING    This is a 33 y.o. male who presents with headache and nausea and vomiting and photophobia that feels like prior migraines.  Normal neurologic examination and vital signs.    Differential Diagnosis includes but is not limited to:  tension migraine, seizure disorder, dehydration, electrolyte abnormality, pancreatitis    ED Course:  12:50 AM - Patient seen and evaluated at bedside. Ordered CBC with differential, CMP, and Lipase to evaluate symptoms. Patient will receive Haldol 2.5 mg, Keppra 500 mg, and Motrin 600 mg. He will additionally receive 1 L IV fluids for dehydration given dry mucous membranes and concern for dehydration, I will keep him n.p.o. except for medications in case a surgical process is identified.      2:18 AM - Reviewed patient's lab results that are reassuring at this time.  Stable labs and electrolytes no anemia or white blood cell count elevation.  No LFT abnormalities doubt acute pancreatitis or other hepatobiliary disease.    2:21 AM - Patient evaluated at bedside. He is resting comfortably with improved mucous membranes, thus having positive response to IV fluids.  He has complete resolution of symptoms and is now tolerating by mouth.  He will be discharged home but instructed to return for worsening pain, vision changes, vomiting, fever, or any other new or worsening symptoms. The patient is understanding and agreeable to discharge.  He has had prior imaging on records review highly doubt brain tumor.  No trauma doubt bleed or fracture, no thunderclap component and he is young and fairly healthy doubt subarachnoid hemorrhage.    Medications   NS infusion 1,000 mL (0 mL Intravenous Stopped  12/2/18 0211)   haloperidol lactate (HALDOL) injection 2.5 mg (2.5 mg Intravenous Given 12/2/18 0107)   levETIRAcetam (KEPPRA) tablet 500 mg (500 mg Oral Given 12/2/18 0107)   ibuprofen (MOTRIN) tablet 600 mg (600 mg Oral Given 12/2/18 0107)       FINAL IMPRESSION  1. Acute nonintractable headache, unspecified headache type    2. Dehydration    3. Polysubstance abuse (HCC)    4. Non-intractable vomiting with nausea, unspecified vomiting type        PRESCRIPTIONS  Discharge Medication List as of 12/2/2018  2:28 AM          FOLLOW UP  NANCY Cartagena  [address]  [facility] [address]  [facility] [id number] [phone]    Schedule an appointment as soon as possible for a visit in 1 day      [facility], Emergency Dept  [address] [facility] [id number] [phone]  Today  If you have ANY new or worse symptoms!        -DISCHARGE-    The patient is referred to a primary physician for blood pressure management, diabetic screening, and for all other preventative health concerns.     Pertinent Labs & Imaging studies reviewed and verified by myself, as well as nursing notes and the patient's past medical, family, and social histories (See chart for details).    Results, exam findings, clinical impression, presumed diagnosis, treatment options, and strict return precautions were discussed with the patient, and they verbalized understanding, agreed with, and appreciated the plan of care.    IVinod (Sherman), am scribing for, and in the presence of, Antoni Bacon M.D..    Electronically signed by: Vinod Zarate (Sherman), 12/2/2018    Antoni HAMLIN M.D. personally performed the services described in this documentation, as scribed by Vinod Zarate in my presence, and it is both accurate and complete.    C.    Portions of this record were made with voice recognition software.  Despite my review, spelling/grammar/context errors may still remain.  Interpretation of this chart should be taken  in this context.    The note accurately reflects work and decisions made by me.  Antoni Bacon  12/2/2018  3:22 PM

## 2018-12-02 NOTE — ED TRIAGE NOTES
"Chief Complaint   Patient presents with   • Seizure     PT states he currently has migraine with nausea and vomitting.  HE states he is concerned he is going to have seizure due to \"aura\" like symptoms and his h/o seizure when detoxing from meth.  Also has h/o seizure d/o and takes keppra for it, but is out of meds for the last week.  Last meth use was 48 hours ago.   • Migraine     Ambulated to room.  Agree with triage assessment.  Changing into gown.  Chart placed for ERP eval.    "

## 2018-12-02 NOTE — ED NOTES
Pt seen here 3 days prior for wounds on his hand from skin picking.  One wound noted on L hand at base of thumb.  Area is covered with band aid.  He received an rx for antibiotic at that visit, but did not  the rx as of yet.

## 2018-12-06 ENCOUNTER — HOSPITAL ENCOUNTER (INPATIENT)
Facility: MEDICAL CENTER | Age: 33
LOS: 4 days | DRG: 513 | End: 2018-12-10
Attending: EMERGENCY MEDICINE | Admitting: HOSPITALIST
Payer: MEDICARE

## 2018-12-06 ENCOUNTER — APPOINTMENT (OUTPATIENT)
Dept: RADIOLOGY | Facility: MEDICAL CENTER | Age: 33
DRG: 513 | End: 2018-12-06
Attending: EMERGENCY MEDICINE
Payer: MEDICARE

## 2018-12-06 DIAGNOSIS — F19.10 POLYSUBSTANCE ABUSE (HCC): ICD-10-CM

## 2018-12-06 DIAGNOSIS — Z86.69 HX OF SEIZURE DISORDER: ICD-10-CM

## 2018-12-06 DIAGNOSIS — A41.9 SEPSIS, DUE TO UNSPECIFIED ORGANISM: ICD-10-CM

## 2018-12-06 DIAGNOSIS — S61.402A OPEN WOUND OF LEFT HAND WITHOUT FOREIGN BODY, UNSPECIFIED WOUND TYPE, INITIAL ENCOUNTER: ICD-10-CM

## 2018-12-06 DIAGNOSIS — L08.9 INFECTION OF HAND: ICD-10-CM

## 2018-12-06 PROBLEM — Z72.0 TOBACCO ABUSE: Status: ACTIVE | Noted: 2018-12-06

## 2018-12-06 PROBLEM — S60.512A: Status: ACTIVE | Noted: 2018-12-06

## 2018-12-06 PROBLEM — R65.10 SIRS (SYSTEMIC INFLAMMATORY RESPONSE SYNDROME) (HCC): Status: ACTIVE | Noted: 2018-12-06

## 2018-12-06 LAB
ALBUMIN SERPL BCP-MCNC: 4.1 G/DL (ref 3.2–4.9)
ALBUMIN/GLOB SERPL: 1.1 G/DL
ALP SERPL-CCNC: 80 U/L (ref 30–99)
ALT SERPL-CCNC: 23 U/L (ref 2–50)
ANION GAP SERPL CALC-SCNC: 10 MMOL/L (ref 0–11.9)
APTT PPP: 36 SEC (ref 24.7–36)
AST SERPL-CCNC: 32 U/L (ref 12–45)
BASOPHILS # BLD AUTO: 0.4 % (ref 0–1.8)
BASOPHILS # BLD: 0.05 K/UL (ref 0–0.12)
BILIRUB SERPL-MCNC: 1.5 MG/DL (ref 0.1–1.5)
BUN SERPL-MCNC: 9 MG/DL (ref 8–22)
CALCIUM SERPL-MCNC: 9.1 MG/DL (ref 8.5–10.5)
CHLORIDE SERPL-SCNC: 98 MMOL/L (ref 96–112)
CO2 SERPL-SCNC: 26 MMOL/L (ref 20–33)
CREAT SERPL-MCNC: 1.12 MG/DL (ref 0.5–1.4)
EOSINOPHIL # BLD AUTO: 0.02 K/UL (ref 0–0.51)
EOSINOPHIL NFR BLD: 0.1 % (ref 0–6.9)
ERYTHROCYTE [DISTWIDTH] IN BLOOD BY AUTOMATED COUNT: 39.2 FL (ref 35.9–50)
ETHANOL BLD-MCNC: 0 G/DL
GLOBULIN SER CALC-MCNC: 3.9 G/DL (ref 1.9–3.5)
GLUCOSE SERPL-MCNC: 98 MG/DL (ref 65–99)
GRAM STN SPEC: NORMAL
HCT VFR BLD AUTO: 45 % (ref 42–52)
HGB BLD-MCNC: 16 G/DL (ref 14–18)
IMM GRANULOCYTES # BLD AUTO: 0.05 K/UL (ref 0–0.11)
IMM GRANULOCYTES NFR BLD AUTO: 0.4 % (ref 0–0.9)
INR PPP: 1.11 (ref 0.87–1.13)
LACTATE BLD-SCNC: 1.1 MMOL/L (ref 0.5–2)
LYMPHOCYTES # BLD AUTO: 2.73 K/UL (ref 1–4.8)
LYMPHOCYTES NFR BLD: 19.2 % (ref 22–41)
MCH RBC QN AUTO: 31.1 PG (ref 27–33)
MCHC RBC AUTO-ENTMCNC: 35.6 G/DL (ref 33.7–35.3)
MCV RBC AUTO: 87.4 FL (ref 81.4–97.8)
MONOCYTES # BLD AUTO: 1.6 K/UL (ref 0–0.85)
MONOCYTES NFR BLD AUTO: 11.2 % (ref 0–13.4)
NEUTROPHILS # BLD AUTO: 9.78 K/UL (ref 1.82–7.42)
NEUTROPHILS NFR BLD: 68.7 % (ref 44–72)
NRBC # BLD AUTO: 0 K/UL
NRBC BLD-RTO: 0 /100 WBC
PLATELET # BLD AUTO: 398 K/UL (ref 164–446)
PMV BLD AUTO: 8.9 FL (ref 9–12.9)
POTASSIUM SERPL-SCNC: 3.3 MMOL/L (ref 3.6–5.5)
PROT SERPL-MCNC: 8 G/DL (ref 6–8.2)
PROTHROMBIN TIME: 14.5 SEC (ref 12–14.6)
RBC # BLD AUTO: 5.15 M/UL (ref 4.7–6.1)
SIGNIFICANT IND 70042: NORMAL
SITE SITE: NORMAL
SODIUM SERPL-SCNC: 134 MMOL/L (ref 135–145)
SOURCE SOURCE: NORMAL
WBC # BLD AUTO: 14.2 K/UL (ref 4.8–10.8)

## 2018-12-06 PROCEDURE — 160027 HCHG SURGERY MINUTES - 1ST 30 MINS LEVEL 2: Performed by: ORTHOPAEDIC SURGERY

## 2018-12-06 PROCEDURE — 700117 HCHG RX CONTRAST REV CODE 255: Performed by: EMERGENCY MEDICINE

## 2018-12-06 PROCEDURE — 500881 HCHG PACK, EXTREMITY: Performed by: ORTHOPAEDIC SURGERY

## 2018-12-06 PROCEDURE — 96367 TX/PROPH/DG ADDL SEQ IV INF: CPT

## 2018-12-06 PROCEDURE — 73201 CT UPPER EXTREMITY W/DYE: CPT | Mod: LT

## 2018-12-06 PROCEDURE — 700111 HCHG RX REV CODE 636 W/ 250 OVERRIDE (IP): Performed by: HOSPITALIST

## 2018-12-06 PROCEDURE — 85730 THROMBOPLASTIN TIME PARTIAL: CPT

## 2018-12-06 PROCEDURE — 87205 SMEAR GRAM STAIN: CPT

## 2018-12-06 PROCEDURE — 96366 THER/PROPH/DIAG IV INF ADDON: CPT

## 2018-12-06 PROCEDURE — 85025 COMPLETE CBC W/AUTO DIFF WBC: CPT

## 2018-12-06 PROCEDURE — 700111 HCHG RX REV CODE 636 W/ 250 OVERRIDE (IP)

## 2018-12-06 PROCEDURE — 160002 HCHG RECOVERY MINUTES (STAT): Performed by: ORTHOPAEDIC SURGERY

## 2018-12-06 PROCEDURE — 96375 TX/PRO/DX INJ NEW DRUG ADDON: CPT

## 2018-12-06 PROCEDURE — 700101 HCHG RX REV CODE 250: Performed by: EMERGENCY MEDICINE

## 2018-12-06 PROCEDURE — 85610 PROTHROMBIN TIME: CPT

## 2018-12-06 PROCEDURE — 700102 HCHG RX REV CODE 250 W/ 637 OVERRIDE(OP): Performed by: HOSPITALIST

## 2018-12-06 PROCEDURE — 700111 HCHG RX REV CODE 636 W/ 250 OVERRIDE (IP): Performed by: INTERNAL MEDICINE

## 2018-12-06 PROCEDURE — 87075 CULTR BACTERIA EXCEPT BLOOD: CPT

## 2018-12-06 PROCEDURE — 87070 CULTURE OTHR SPECIMN AEROBIC: CPT

## 2018-12-06 PROCEDURE — 501445 HCHG STAPLER, SKIN DISP: Performed by: ORTHOPAEDIC SURGERY

## 2018-12-06 PROCEDURE — 160038 HCHG SURGERY MINUTES - EA ADDL 1 MIN LEVEL 2: Performed by: ORTHOPAEDIC SURGERY

## 2018-12-06 PROCEDURE — 160009 HCHG ANES TIME/MIN: Performed by: ORTHOPAEDIC SURGERY

## 2018-12-06 PROCEDURE — 700101 HCHG RX REV CODE 250

## 2018-12-06 PROCEDURE — 96365 THER/PROPH/DIAG IV INF INIT: CPT

## 2018-12-06 PROCEDURE — 700105 HCHG RX REV CODE 258: Performed by: EMERGENCY MEDICINE

## 2018-12-06 PROCEDURE — A6223 GAUZE >16<=48 NO W/SAL W/O B: HCPCS | Performed by: ORTHOPAEDIC SURGERY

## 2018-12-06 PROCEDURE — 160048 HCHG OR STATISTICAL LEVEL 1-5: Performed by: ORTHOPAEDIC SURGERY

## 2018-12-06 PROCEDURE — 0HDQXZZ EXTRACTION OF FINGER NAIL, EXTERNAL APPROACH: ICD-10-PCS | Performed by: ORTHOPAEDIC SURGERY

## 2018-12-06 PROCEDURE — 87186 SC STD MICRODIL/AGAR DIL: CPT

## 2018-12-06 PROCEDURE — 96372 THER/PROPH/DIAG INJ SC/IM: CPT

## 2018-12-06 PROCEDURE — 36415 COLL VENOUS BLD VENIPUNCTURE: CPT

## 2018-12-06 PROCEDURE — 80307 DRUG TEST PRSMV CHEM ANLYZR: CPT

## 2018-12-06 PROCEDURE — 770006 HCHG ROOM/CARE - MED/SURG/GYN SEMI*

## 2018-12-06 PROCEDURE — 99291 CRITICAL CARE FIRST HOUR: CPT

## 2018-12-06 PROCEDURE — 160036 HCHG PACU - EA ADDL 30 MINS PHASE I: Performed by: ORTHOPAEDIC SURGERY

## 2018-12-06 PROCEDURE — 0JBK0ZZ EXCISION OF LEFT HAND SUBCUTANEOUS TISSUE AND FASCIA, OPEN APPROACH: ICD-10-PCS | Performed by: ORTHOPAEDIC SURGERY

## 2018-12-06 PROCEDURE — 700111 HCHG RX REV CODE 636 W/ 250 OVERRIDE (IP): Performed by: EMERGENCY MEDICINE

## 2018-12-06 PROCEDURE — 94760 N-INVAS EAR/PLS OXIMETRY 1: CPT

## 2018-12-06 PROCEDURE — 87040 BLOOD CULTURE FOR BACTERIA: CPT

## 2018-12-06 PROCEDURE — A6454 SELF-ADHER BAND W>=3" <5"/YD: HCPCS | Performed by: ORTHOPAEDIC SURGERY

## 2018-12-06 PROCEDURE — A9270 NON-COVERED ITEM OR SERVICE: HCPCS | Performed by: HOSPITALIST

## 2018-12-06 PROCEDURE — 700105 HCHG RX REV CODE 258: Performed by: HOSPITALIST

## 2018-12-06 PROCEDURE — 160035 HCHG PACU - 1ST 60 MINS PHASE I: Performed by: ORTHOPAEDIC SURGERY

## 2018-12-06 PROCEDURE — 87077 CULTURE AEROBIC IDENTIFY: CPT | Mod: 91

## 2018-12-06 PROCEDURE — 80053 COMPREHEN METABOLIC PANEL: CPT

## 2018-12-06 PROCEDURE — 99233 SBSQ HOSP IP/OBS HIGH 50: CPT | Performed by: HOSPITALIST

## 2018-12-06 PROCEDURE — 83605 ASSAY OF LACTIC ACID: CPT

## 2018-12-06 RX ORDER — LEVETIRACETAM 500 MG/1
500 TABLET ORAL 2 TIMES DAILY
Status: DISCONTINUED | OUTPATIENT
Start: 2018-12-06 | End: 2018-12-10 | Stop reason: HOSPADM

## 2018-12-06 RX ORDER — HEPARIN SODIUM 5000 [USP'U]/ML
5000 INJECTION, SOLUTION INTRAVENOUS; SUBCUTANEOUS EVERY 8 HOURS
Status: DISCONTINUED | OUTPATIENT
Start: 2018-12-06 | End: 2018-12-10 | Stop reason: HOSPADM

## 2018-12-06 RX ORDER — SODIUM CHLORIDE 9 MG/ML
500 INJECTION, SOLUTION INTRAVENOUS
Status: DISCONTINUED | OUTPATIENT
Start: 2018-12-06 | End: 2018-12-10 | Stop reason: HOSPADM

## 2018-12-06 RX ORDER — AMOXICILLIN 250 MG
2 CAPSULE ORAL 2 TIMES DAILY
Status: DISCONTINUED | OUTPATIENT
Start: 2018-12-06 | End: 2018-12-10 | Stop reason: HOSPADM

## 2018-12-06 RX ORDER — BISACODYL 10 MG
10 SUPPOSITORY, RECTAL RECTAL
Status: DISCONTINUED | OUTPATIENT
Start: 2018-12-06 | End: 2018-12-10 | Stop reason: HOSPADM

## 2018-12-06 RX ORDER — NICOTINE 21 MG/24HR
14 PATCH, TRANSDERMAL 24 HOURS TRANSDERMAL
Status: DISCONTINUED | OUTPATIENT
Start: 2018-12-06 | End: 2018-12-10 | Stop reason: HOSPADM

## 2018-12-06 RX ORDER — ONDANSETRON 4 MG/1
4 TABLET, ORALLY DISINTEGRATING ORAL EVERY 4 HOURS PRN
Status: DISCONTINUED | OUTPATIENT
Start: 2018-12-06 | End: 2018-12-10 | Stop reason: HOSPADM

## 2018-12-06 RX ORDER — HALOPERIDOL 5 MG/ML
1 INJECTION INTRAMUSCULAR
Status: DISCONTINUED | OUTPATIENT
Start: 2018-12-06 | End: 2018-12-06 | Stop reason: HOSPADM

## 2018-12-06 RX ORDER — OXYCODONE HYDROCHLORIDE 5 MG/1
10 TABLET ORAL
Status: DISCONTINUED | OUTPATIENT
Start: 2018-12-06 | End: 2018-12-06 | Stop reason: HOSPADM

## 2018-12-06 RX ORDER — ONDANSETRON 2 MG/ML
4 INJECTION INTRAMUSCULAR; INTRAVENOUS EVERY 4 HOURS PRN
Status: DISCONTINUED | OUTPATIENT
Start: 2018-12-06 | End: 2018-12-10 | Stop reason: HOSPADM

## 2018-12-06 RX ORDER — POLYETHYLENE GLYCOL 3350 17 G/17G
1 POWDER, FOR SOLUTION ORAL
Status: DISCONTINUED | OUTPATIENT
Start: 2018-12-06 | End: 2018-12-10 | Stop reason: HOSPADM

## 2018-12-06 RX ORDER — MAGNESIUM HYDROXIDE 1200 MG/15ML
LIQUID ORAL
Status: COMPLETED | OUTPATIENT
Start: 2018-12-06 | End: 2018-12-06

## 2018-12-06 RX ORDER — SODIUM CHLORIDE 9 MG/ML
1000 INJECTION, SOLUTION INTRAVENOUS ONCE
Status: COMPLETED | OUTPATIENT
Start: 2018-12-06 | End: 2018-12-06

## 2018-12-06 RX ORDER — ONDANSETRON 2 MG/ML
4 INJECTION INTRAMUSCULAR; INTRAVENOUS
Status: DISCONTINUED | OUTPATIENT
Start: 2018-12-06 | End: 2018-12-06 | Stop reason: HOSPADM

## 2018-12-06 RX ORDER — MORPHINE SULFATE 10 MG/ML
2 INJECTION, SOLUTION INTRAMUSCULAR; INTRAVENOUS
Status: DISCONTINUED | OUTPATIENT
Start: 2018-12-06 | End: 2018-12-07

## 2018-12-06 RX ORDER — ACETAMINOPHEN 325 MG/1
650 TABLET ORAL EVERY 6 HOURS PRN
Status: DISCONTINUED | OUTPATIENT
Start: 2018-12-06 | End: 2018-12-10 | Stop reason: HOSPADM

## 2018-12-06 RX ORDER — CLINDAMYCIN PHOSPHATE 600 MG/50ML
600 INJECTION, SOLUTION INTRAVENOUS ONCE
Status: COMPLETED | OUTPATIENT
Start: 2018-12-06 | End: 2018-12-06

## 2018-12-06 RX ORDER — SODIUM CHLORIDE, SODIUM LACTATE, POTASSIUM CHLORIDE, CALCIUM CHLORIDE 600; 310; 30; 20 MG/100ML; MG/100ML; MG/100ML; MG/100ML
INJECTION, SOLUTION INTRAVENOUS CONTINUOUS
Status: DISCONTINUED | OUTPATIENT
Start: 2018-12-06 | End: 2018-12-06 | Stop reason: HOSPADM

## 2018-12-06 RX ORDER — HYDROMORPHONE HYDROCHLORIDE 1 MG/ML
0.4 INJECTION, SOLUTION INTRAMUSCULAR; INTRAVENOUS; SUBCUTANEOUS
Status: DISCONTINUED | OUTPATIENT
Start: 2018-12-06 | End: 2018-12-06 | Stop reason: HOSPADM

## 2018-12-06 RX ORDER — SODIUM CHLORIDE 9 MG/ML
INJECTION, SOLUTION INTRAVENOUS CONTINUOUS
Status: DISCONTINUED | OUTPATIENT
Start: 2018-12-06 | End: 2018-12-10 | Stop reason: HOSPADM

## 2018-12-06 RX ORDER — MORPHINE SULFATE 10 MG/ML
4 INJECTION, SOLUTION INTRAMUSCULAR; INTRAVENOUS ONCE
Status: COMPLETED | OUTPATIENT
Start: 2018-12-06 | End: 2018-12-06

## 2018-12-06 RX ORDER — BUSPIRONE HYDROCHLORIDE 7.5 MG/1
7.5 TABLET ORAL 2 TIMES DAILY
COMMUNITY
End: 2019-01-11 | Stop reason: SDUPTHER

## 2018-12-06 RX ORDER — HYDROMORPHONE HYDROCHLORIDE 1 MG/ML
0.1 INJECTION, SOLUTION INTRAMUSCULAR; INTRAVENOUS; SUBCUTANEOUS
Status: DISCONTINUED | OUTPATIENT
Start: 2018-12-06 | End: 2018-12-06 | Stop reason: HOSPADM

## 2018-12-06 RX ORDER — OXYCODONE HYDROCHLORIDE 5 MG/1
2.5 TABLET ORAL
Status: DISCONTINUED | OUTPATIENT
Start: 2018-12-06 | End: 2018-12-10 | Stop reason: HOSPADM

## 2018-12-06 RX ORDER — OXYCODONE HCL 5 MG/5 ML
5 SOLUTION, ORAL ORAL
Status: DISCONTINUED | OUTPATIENT
Start: 2018-12-06 | End: 2018-12-06 | Stop reason: HOSPADM

## 2018-12-06 RX ORDER — ONDANSETRON 2 MG/ML
4 INJECTION INTRAMUSCULAR; INTRAVENOUS ONCE
Status: COMPLETED | OUTPATIENT
Start: 2018-12-06 | End: 2018-12-06

## 2018-12-06 RX ORDER — PROMETHAZINE HYDROCHLORIDE 25 MG/1
12.5-25 TABLET ORAL EVERY 4 HOURS PRN
Status: DISCONTINUED | OUTPATIENT
Start: 2018-12-06 | End: 2018-12-10 | Stop reason: HOSPADM

## 2018-12-06 RX ORDER — HALOPERIDOL 5 MG/ML
1 INJECTION INTRAMUSCULAR EVERY 4 HOURS PRN
Status: DISCONTINUED | OUTPATIENT
Start: 2018-12-06 | End: 2018-12-10 | Stop reason: HOSPADM

## 2018-12-06 RX ORDER — RISPERIDONE 0.5 MG/1
3 TABLET ORAL 2 TIMES DAILY
Status: DISCONTINUED | OUTPATIENT
Start: 2018-12-06 | End: 2018-12-07

## 2018-12-06 RX ORDER — PROMETHAZINE HYDROCHLORIDE 25 MG/1
12.5-25 SUPPOSITORY RECTAL EVERY 4 HOURS PRN
Status: DISCONTINUED | OUTPATIENT
Start: 2018-12-06 | End: 2018-12-10 | Stop reason: HOSPADM

## 2018-12-06 RX ORDER — OXYCODONE HCL 5 MG/5 ML
10 SOLUTION, ORAL ORAL
Status: DISCONTINUED | OUTPATIENT
Start: 2018-12-06 | End: 2018-12-06 | Stop reason: HOSPADM

## 2018-12-06 RX ORDER — OXYCODONE HYDROCHLORIDE 5 MG/1
5 TABLET ORAL
Status: DISCONTINUED | OUTPATIENT
Start: 2018-12-06 | End: 2018-12-06 | Stop reason: HOSPADM

## 2018-12-06 RX ORDER — HYDROMORPHONE HYDROCHLORIDE 1 MG/ML
0.2 INJECTION, SOLUTION INTRAMUSCULAR; INTRAVENOUS; SUBCUTANEOUS
Status: DISCONTINUED | OUTPATIENT
Start: 2018-12-06 | End: 2018-12-06 | Stop reason: HOSPADM

## 2018-12-06 RX ORDER — OXYCODONE HYDROCHLORIDE 5 MG/1
5 TABLET ORAL
Status: DISCONTINUED | OUTPATIENT
Start: 2018-12-06 | End: 2018-12-10 | Stop reason: HOSPADM

## 2018-12-06 RX ADMIN — HEPARIN SODIUM 5000 UNITS: 5000 INJECTION, SOLUTION INTRAVENOUS; SUBCUTANEOUS at 21:11

## 2018-12-06 RX ADMIN — IOHEXOL 80 ML: 350 INJECTION, SOLUTION INTRAVENOUS at 04:12

## 2018-12-06 RX ADMIN — VANCOMYCIN HYDROCHLORIDE 1300 MG: 100 INJECTION, POWDER, LYOPHILIZED, FOR SOLUTION INTRAVENOUS at 16:15

## 2018-12-06 RX ADMIN — RISPERIDONE 3 MG: 0.5 TABLET ORAL at 21:11

## 2018-12-06 RX ADMIN — HALOPERIDOL LACTATE 1 MG: 5 INJECTION, SOLUTION INTRAMUSCULAR at 09:01

## 2018-12-06 RX ADMIN — MORPHINE SULFATE 4 MG: 10 INJECTION INTRAVENOUS at 03:05

## 2018-12-06 RX ADMIN — NICOTINE POLACRILEX 2 MG: 2 GUM, CHEWING BUCCAL at 18:24

## 2018-12-06 RX ADMIN — AMPICILLIN SODIUM AND SULBACTAM SODIUM 3 G: 2; 1 INJECTION, POWDER, FOR SOLUTION INTRAMUSCULAR; INTRAVENOUS at 07:35

## 2018-12-06 RX ADMIN — SODIUM CHLORIDE 1000 ML: 9 INJECTION, SOLUTION INTRAVENOUS at 03:27

## 2018-12-06 RX ADMIN — SODIUM CHLORIDE, SODIUM LACTATE, POTASSIUM CHLORIDE, CALCIUM CHLORIDE: 600; 310; 30; 20 INJECTION, SOLUTION INTRAVENOUS at 13:43

## 2018-12-06 RX ADMIN — AMPICILLIN SODIUM AND SULBACTAM SODIUM 3 G: 2; 1 INJECTION, POWDER, FOR SOLUTION INTRAMUSCULAR; INTRAVENOUS at 18:31

## 2018-12-06 RX ADMIN — NICOTINE 14 MG: 14 PATCH, EXTENDED RELEASE TRANSDERMAL at 18:24

## 2018-12-06 RX ADMIN — SODIUM CHLORIDE: 9 INJECTION, SOLUTION INTRAVENOUS at 21:11

## 2018-12-06 RX ADMIN — CLINDAMYCIN IN 5 PERCENT DEXTROSE 600 MG: 12 INJECTION, SOLUTION INTRAVENOUS at 02:38

## 2018-12-06 RX ADMIN — LEVETIRACETAM 500 MG: 500 TABLET ORAL at 17:58

## 2018-12-06 RX ADMIN — ONDANSETRON HYDROCHLORIDE 4 MG: 2 INJECTION, SOLUTION INTRAMUSCULAR; INTRAVENOUS at 03:06

## 2018-12-06 RX ADMIN — VANCOMYCIN HYDROCHLORIDE 2000 MG: 100 INJECTION, POWDER, LYOPHILIZED, FOR SOLUTION INTRAVENOUS at 03:26

## 2018-12-06 ASSESSMENT — PATIENT HEALTH QUESTIONNAIRE - PHQ9
6. FEELING BAD ABOUT YOURSELF - OR THAT YOU ARE A FAILURE OR HAVE LET YOURSELF OR YOUR FAMILY DOWN: MORE THAN HALF THE DAYS
2. FEELING DOWN, DEPRESSED, IRRITABLE, OR HOPELESS: NEARLY EVERY DAY
5. POOR APPETITE OR OVEREATING: NOT AT ALL
1. LITTLE INTEREST OR PLEASURE IN DOING THINGS: SEVERAL DAYS
3. TROUBLE FALLING OR STAYING ASLEEP OR SLEEPING TOO MUCH: SEVERAL DAYS
8. MOVING OR SPEAKING SO SLOWLY THAT OTHER PEOPLE COULD HAVE NOTICED. OR THE OPPOSITE, BEING SO FIGETY OR RESTLESS THAT YOU HAVE BEEN MOVING AROUND A LOT MORE THAN USUAL: NOT AT ALL
SUM OF ALL RESPONSES TO PHQ QUESTIONS 1-9: 9
7. TROUBLE CONCENTRATING ON THINGS, SUCH AS READING THE NEWSPAPER OR WATCHING TELEVISION: NOT AT ALL
SUM OF ALL RESPONSES TO PHQ9 QUESTIONS 1 AND 2: 4
9. THOUGHTS THAT YOU WOULD BE BETTER OFF DEAD, OR OF HURTING YOURSELF: SEVERAL DAYS
4. FEELING TIRED OR HAVING LITTLE ENERGY: SEVERAL DAYS

## 2018-12-06 ASSESSMENT — ENCOUNTER SYMPTOMS
FEVER: 0
CHILLS: 0
PHOTOPHOBIA: 0
WHEEZING: 0
NAUSEA: 0
ABDOMINAL PAIN: 0
VOMITING: 0
HEADACHES: 0
MYALGIAS: 0
DIZZINESS: 0
SORE THROAT: 0
DEPRESSION: 0
TINGLING: 0
SHORTNESS OF BREATH: 0
PALPITATIONS: 0
COUGH: 0
DIARRHEA: 0
FOCAL WEAKNESS: 0

## 2018-12-06 ASSESSMENT — PAIN SCALES - GENERAL
PAINLEVEL_OUTOF10: 0
PAINLEVEL_OUTOF10: 10
PAINLEVEL_OUTOF10: 0

## 2018-12-06 ASSESSMENT — COGNITIVE AND FUNCTIONAL STATUS - GENERAL
MOBILITY SCORE: 24
SUGGESTED CMS G CODE MODIFIER MOBILITY: CH
DAILY ACTIVITIY SCORE: 24
SUGGESTED CMS G CODE MODIFIER DAILY ACTIVITY: CH

## 2018-12-06 ASSESSMENT — COPD QUESTIONNAIRES
IN THE PAST 12 MONTHS DO YOU DO LESS THAN YOU USED TO BECAUSE OF YOUR BREATHING PROBLEMS: DISAGREE/UNSURE
DO YOU EVER COUGH UP ANY MUCUS OR PHLEGM?: NO/ONLY WITH OCCASIONAL COLDS OR INFECTIONS
COPD SCREENING SCORE: 1
HAVE YOU SMOKED AT LEAST 100 CIGARETTES IN YOUR ENTIRE LIFE: NO/DON'T KNOW
DURING THE PAST 4 WEEKS HOW MUCH DID YOU FEEL SHORT OF BREATH: SOME OF THE TIME

## 2018-12-06 ASSESSMENT — PAIN DESCRIPTION - DESCRIPTORS: DESCRIPTORS: TENDER;SHARP

## 2018-12-06 NOTE — PROGRESS NOTES
Patient admitted after midnight, please see H&P dictated by Dr. Aguilera for further details. Patient admitted after midnight for left hand abscess. Patient has hx of meth abuse, UDS pending. Pt NPO presently for surgical intervention.

## 2018-12-06 NOTE — ED NOTES
Hourly rounding performed. Assessed patient complaints and Bathroom/comfort needs.    Pt resting comfortably.

## 2018-12-06 NOTE — PROGRESS NOTES
Pharmacy Kinetics 33 y.o. male on vancomycin day # 1 2018    Indication for Treatment: SSTI    Pertinent history per medical record: Admitted on 2018 for finger pain with associated swelling. Patient reports purulent drainage - imaging demonstrated fluid collection. With concern for infection, empiric antibiotic therapy initiated and orthopedic consultation to be obtained.     Other antibiotics: Ampicillin/sulbactam 3 g IV q6h    Allergies: Patient has no known allergies.     List concerns for renal function: CT with contrast 18    Pertinent cultures to date:     None    Recent Labs      18   0235   WBC  14.2*   NEUTSPOLYS  68.70     Recent Labs      18   0235   BUN  9   CREATININE  1.12   ALBUMIN  4.1     Blood pressure 108/72, pulse (!) 102, temperature 36.7 °C (98.1 °F), temperature source Temporal, resp. rate 16, weight 79.4 kg (175 lb 0.7 oz), SpO2 98 %. Temp (24hrs), Av.7 °C (98.1 °F), Min:36.7 °C (98.1 °F), Max:36.7 °C (98.1 °F)      A/P   1. Vancomycin dose change: Give 2000 mg IV loading dose followed by 1300 mg IV q12h scheduled dosing  2. Next vancomycin level: Trough in ~2 days (not ordered)  3. Goal trough: 12-16 mcg/mL  4. Comments: Therapy with vancomycin initiated empirically for SSTI. With limited concerns for accumulation, will provide loading dose and start scheduled dosing thereafter as outlined above. Will plan to check trough level when patient closer to steady state in order to ensure appropriate clearance and drug levels. Recommend de-escalation if MRSA can be ruled out.  Pharmacy will continue to follow.     Jai HoldenD, BCPS

## 2018-12-06 NOTE — ED NOTES
LMTCB Pt requests to call and talk with mother. Pt talks with mother on phone, shortly afterward pt's sister calls in to talk with nursing staff, pt's sister states pt is wanting to leave, but family does not wish for him to leave. Family wants pt to stay receive tx for hand. Family also states pt has a hx of schizophrenia with hallucinations and homicidal ideation as well as bipolar. Pt Currently denies SI/HI. Pt's family states pt has also been using meth again within the past 2 days as well a janine and THC. Pt's family states they would prefer pt stay in hospital and or go to a psych facility like [facility].

## 2018-12-06 NOTE — ED NOTES
Med rec completed per pt's home pharmacy ([facility]) Pt unable to go over his meds  Allergies reviewed    Pt picked up a Keflex 500 mg 4 times a day for a 5 day course on 11/30/18

## 2018-12-06 NOTE — PROGRESS NOTES
Attending Hospitalist today is Dr. Low starting at 0700.  Please call this physician for orders, updates, and questions.

## 2018-12-06 NOTE — OR SURGEON
Immediate Post OP Note    PreOp Diagnosis: Left thumb infection    PostOp Diagnosis: left thumb infection    Procedure(s):  IRRIGATION & DEBRIDEMENT ORTHO - Wound Class: Dirty or Infected    Surgeon(s):  Rusty Bianchi M.D.    Anesthesiologist/Type of Anesthesia:  Anesthesiologist: Amanda Sánchez M.D./General    Surgical Staff:  Circulator: Muna Gary R.N.  Scrub Person: Adilene Miller    Specimens removed if any:  ID Type Source Tests Collected by Time Destination   1 : Left thumb infection fluid Body Fluid Finger AEROBIC/ANAEROBIC CULTURE (SURGERY) Rusty Bianchi M.D. 12/6/2018 11:37 AM        Estimated Blood Loss: minimal    Findings: see dictation    Complications: none known    PLAN:  --readmit medicine postop  --continue empiric abx, fu cultures  --start wound care tomorrow        12/6/2018 11:46 AM Rusty Bianchi M.D.

## 2018-12-06 NOTE — OP REPORT
DATE OF SERVICE:  12/06/2018    PREOPERATIVE DIAGNOSIS:  Left thumb infection.    POSTOPERATIVE DIAGNOSIS:  Left thumb infection.    PROCEDURE PERFORMED:  Excisional debridement of left thumb infected wound   including skin, subcutaneous tissue and fascia.    SURGEON:  Rusty Bianchi MD    ANESTHESIOLOGIST:  Amanda Sánchez MD    ANESTHESIA:  General.    ESTIMATED BLOOD LOSS:  Minimal.    SPECIMENS:  Wound swabs were sent to lab for culture.    INDICATION FOR PROCEDURE:  The patient is a 33-year-old male.  He has history   of psychiatric disorders and polysubstance abuse who presented to the   emergency department early this morning with left thumb infection and   significant swelling consistent with hemorrhagic type blister.  He also had   elevated inflammatory markers and concern for cellulitis with potential   associated abscess.  He had a CT, which showed a dermal fluid collection and I   was consulted and recommended going to the operating room for excisional   debridement of his wound.  The patient signed informed consent preoperatively.    DESCRIPTION OF PROCEDURE:  The patient was met in the preop holding area and   his surgical site was signed and his consent was confirmed for accuracy.  He   was taken back to the operating room and general anesthesia was induced.  Left   upper extremity was prepped and draped in the usual sterile fashion.  The   blister had spontaneously decompressed with serous fluid that was draining.  A   formal timeout was performed to confirm patient's correct name, correct   surgical site, correct procedure and correct laterality.  The thumb was   explored, at the base of the nail plate, it was detached and the nail plate   was elevated and removed.  There was some purulent fluid in this area, which   was swabbed and sent to lab for culture.  He had a previously healing wound at   the base of the thumb radially, which was reincised and explored.  There was   no obvious  purulence there.  There was no obvious foreign body present.  I   spread in the subcutaneous plane and there was no purulence identified.  I   made a dorsal incision adjacent to the thumb extensor tendon and explored this   area down to fascia and just slightly through fascia to make sure there was   no purulence there, which there was not.  I thoroughly irrigated those 2   incisional wound and debrided the inflamed dermal tissue and the remainder of   the thumb with a curette to a healthy bleeding dermal tissue.  The wound   including the entire thumb was thoroughly irrigated with normal saline using   Pulsavac and I covered the exposed dermal tissue and the wounds with Xeroform   and applied an absorptive loosely compressive dressing.  He was then awoken   from anesthesia, transferred on the rBelle and taken to postanesthesia care   unit in stable condition.    PLAN:  1.  The patient will be readmitted to the medicine service postop.  2.  I recommend continuing empiric antibiotics and following up intraoperative   cultures.  3.  I have requested inpatient wound care to start daily dressing changes   tomorrow.       ____________________________________     MD RUBY Presley / DIAMOND    DD:  12/06/2018 11:52:58  DT:  12/06/2018 12:03:57    D#:  [id number]  Job#:  [id number]

## 2018-12-06 NOTE — ASSESSMENT & PLAN NOTE
Reports using methamphetamine 2-3 days ago  Reports using MDMA in the last several days  History of chronic pain with multiple procedures in the past

## 2018-12-06 NOTE — ED NOTES
Pt reports a crush injury to his Left hand a few days ago. Has noted increased swell over the last few days, significant increase today. Pt reports numbness in his entire left hand, but noted significant swelling and discoloration to the left thumb. + cap refill noted in other fingers on the left hand.

## 2018-12-06 NOTE — ED NOTES
Pt requesting to leave. Pt informed he would been screened by life skills and then could sign out   AMA if he still wished to leave.

## 2018-12-06 NOTE — ED TRIAGE NOTES
"Hamlet Islas  33 y.o.  male  Chief Complaint   Patient presents with   • Digit Pain     left thumb swelling      Present to triage c/o left thumb swelling x 3-4 days ago. States \" a piece of metal inside my thumb\"  Wound noted on the base of thumb. No drainage noted. Last tetanus shot < 5 years ago.  "

## 2018-12-06 NOTE — ASSESSMENT & PLAN NOTE
Review of the records back to 2014 shows the patient has not been seen for seizure   He reports a seizure withdrawing from methamphetamine  No clear history of seizure disorder  We will resume his outpatient Keppra, however, given his polysubstance abuse and lower seizure threshold

## 2018-12-06 NOTE — ED NOTES
Pt in room picking at left thumb, pt instructed not to pick at thumb. 5 minutes later this RN returns to room finds pt has removed skin from top of thumb. MADDIE and adaptic dressing with gauze and wrap gauze on top applied.

## 2018-12-06 NOTE — H&P
"Hospital Medicine History & Physical Note    Date of Service  12/6/2018    Primary Care Physician  BASHIR [facility]agena.    Consultants  Dr. Landeros, orthopedic surgery    Code Status  Full    Chief Complaint  Chief Complaint   Patient presents with   • Digit Pain     left thumb swelling        History of Presenting Illness  33 y.o. male who presented on 12/6/2018 with left thumb swelling and pain.  Patient carries a history of seizure disorder, bipolar disorder, and review of the medical record shows frequent hospital visits for various complaints as well as a history of drug abuse including methamphetamine.  He comes in with complaints of swelling of his left thumb and hand.  During his initial assessment with the emergency room physician, he reported a crush injury with a piece of lodged metal which is subsequently developed purulence and drainage.  When asked again by me, he states that he does not know how he injured his hand.  He says that he may have injured it while he was cooking recently but when asked if he ever injects drugs subcutaneously, he becomes evasive and will not answer the question fully.  He does admit to using \"Citlaly\" this evening to the emergency room physician but will not confirm when he last used injected illicit drugs.  He reports that he has pain in his left hand which radiates toward his wrist and is worse with movement.  He denies any alleviating factors.  He denies any fevers, chills, nausea, vomiting, headache, abdominal pain, diarrhea or dysuria.  Upon assessment in the emergency room, the patient has a very large and swollen left hand with an open abrasion on the thenar aspect.      Review of Systems  Review of Systems   Constitutional: Negative for chills and fever.   HENT: Negative for congestion and sore throat.    Eyes: Negative for photophobia.   Respiratory: Negative for cough, shortness of breath and wheezing.    Cardiovascular: Negative for chest pain and palpitations. "   Gastrointestinal: Negative for abdominal pain, diarrhea, nausea and vomiting.   Genitourinary: Negative for dysuria.   Musculoskeletal: Negative for myalgias.        Left thumb and hand swelling   Skin: Negative.    Neurological: Negative for dizziness, tingling, focal weakness and headaches.   Psychiatric/Behavioral: Negative for depression and suicidal ideas.       Past Medical History  Past Medical History:   Diagnosis Date   • Chronic hip pain 9/8/2014   • Scoliosis 9/8/2014   • Bipolar disorder with depression (HCC) 9/8/2014   • Asthma    • Bipolar disorder (HCC)    • Chronic low back pain    • Migraine    • Neuropathy ([facility])    • Obsessive compulsive disorder    • Post traumatic stress disorder (PTSD)    • Psoriasis    • Schizoaffective disorder ([facility])    • Tendinitis of foot     left foot, chronic       Surgical History  Past Surgical History:   Procedure Laterality Date   • OTHER      oral       Family History  Family History   Problem Relation Age of Onset   • Other Mother         discoid lupus   • Cancer Mother         leukemia   • Hyperlipidemia Mother    • Hypertension Mother    • Cancer Father         lung       Social History  Social History   Substance Use Topics   • Smoking status: Current Every Day Smoker     Packs/day: 0.50     Types: Cigarettes   • Smokeless tobacco: Never Used   • Alcohol use Yes      Comment: rare       Allergies  No Known Allergies    Medications  No current facility-administered medications on file prior to encounter.      Current Outpatient Prescriptions on File Prior to Encounter   Medication Sig Dispense Refill   • naproxen (NAPROSYN) 500 MG Tab TAKE 1 TABLET BY MOUTH TWICE DAILY AS NEEDED 180 Tab 0   • meloxicam (MOBIC) 7.5 MG Tab TAKE 1 TABLET BY MOUTH EVERY DAY 90 Tab 1   • risperiDONE (RISPERDAL) 3 MG Tab Take 1 Tab by mouth 2 times a day. 60 Tab 1   • levETIRAcetam (KEPPRA) 500 MG Tab Take 1 Tab by mouth 2 times a day. 60 Tab 1   • promethazine (PHENERGAN) 25 MG Tab  Take 1 Tab by mouth 2 times a day as needed for Nausea/Vomiting. 60 Tab 0   • tizanidine (ZANAFLEX) 4 MG capsule Take 1 Cap by mouth 3 times a day. 15 Cap 0   • lidocaine (LIDODERM) 5 % Patch Apply 1 Patch to skin as directed every 24 hours. 30 Patch 0       Physical Exam  Hemodynamics  Temp (24hrs), Av.7 °C (98.1 °F), Min:36.7 °C (98.1 °F), Max:36.7 °C (98.1 °F)   Temperature: 36.7 °C (98.1 °F)  Pulse  Av.4  Min: 71  Max: 118    Blood Pressure: 108/72, NIBP: 119/76      Respiratory      Respiration: 16, Pulse Oximetry: 98 %             Physical Exam  Capillary refill less than 3 seconds, distal pulses intact    Laboratory:  Recent Labs      18   WBC  14.2*   RBC  5.15   HEMOGLOBIN  16.0   HEMATOCRIT  45.0   MCV  87.4   MCH  31.1   MCHC  35.6*   RDW  39.2   PLATELETCT  398   MPV  8.9*     Recent Labs      18   023   SODIUM  134*   POTASSIUM  3.3*   CHLORIDE  98   CO2  26   GLUCOSE  98   BUN  9   CREATININE  1.12   CALCIUM  9.1     Recent Labs      18   ALTSGPT  23   ASTSGOT  32   ALKPHOSPHAT  80   TBILIRUBIN  1.5   GLUCOSE  98     Recent Labs      18   APTT  36.0   INR  1.11             No results found for: TROPONINI    Imaging  Ct-hand With Left    Result Date: 2018 3:36 AM HISTORY/REASON FOR EXAM:  Crush injury to left hand with swelling and discoloration of left thumb. TECHNIQUE/ EXAM DESCRIPTION AND NUMBER OF VIEWS: CT scan of the LEFT hand with contrast, with reconstructions. Thin-section noncontrast helical images were obtained from the distal radius/ulna through the proximal metacarpals. Coronal and sagittal reconstructions were generated from the axial images. A total of 80 mL of Omnipaque 350 nonionic contrast was administered IV without complication. Up to date radiation dose reduction adjustments have been utilized to meet ALARA standards for radiation dose reduction. COMPARISON:  None. FINDINGS: No hand or wrist fracture or  dislocation is identified. There is diffuse edema overlying the extensor aspect of the hand and wrist. There is soft tissue swelling along the extensor aspect of the left thumb diffusely consistent with hematoma or edema.     1.  Extensive subcutaneous fluid along the extensor aspect of the left thumb which may represent edema or hematoma. 2.  Extensive fluid or edema overlying the extensor aspect of the left hand and wrist. 3.  No acute hand fracture or dislocation.        Assessment/Plan:  Anticipate that patient will need greater than 2 midnights for management of the discussed medical issues.    * Infected abrasion of hand, left, initial encounter   Assessment & Plan    Patient is evasive and will not elaborate on how he he obtained his injury on the thenar aspect of his left thumb.  Given his history of methamphetamine abuse, injection of drugs is of concern.  CT scan shows extensive subcutaneous fluid but no reported gas formation is noted.  He does have leukocytosis and tachycardia otherwise he is afebrile with stable vital signs and normal blood pressures.  There is no evidence of endorgan damage.  I am starting him on broad-spectrum antibiotic therapy with vancomycin and Unasyn, he will receive symptomatic management for pain, and will monitor cultures for speciation and sensitivities.  I will keep him n.p.o. while we await consultation from orthopedic surgery in the event that he needs to proceed to the emergency room this morning.     SIRS (systemic inflammatory response syndrome) (HCC)   Assessment & Plan    Treatment as noted above.     Drug abuse (HCC)   Assessment & Plan    As noted above, patient is quite evasive and also a very poor historian.  Urine drug screen is currently pending.  Would benefit from social work consultation prior to discharge to discuss community resources for cessation.     Hx of seizure disorder- (present on admission)   Assessment & Plan    This is chronic, patient denies any  recent seizures, continue home Keppra.     Tobacco abuse   Assessment & Plan    This patient continues to smoke cigarettes. 3 minutes were spent counseling the patient in cessation techniques. Patient understands smoking increases risk factors for stroke and death. Patient is not ready to quit.  The benefits of stopping were presented and nicotine replacement has been ordered to assist with withdrawal from nicotine during hospitalization and we will continue to encourage cessation and discuss other supportive resources including community groups and prescription medications.         Prophylaxis: Heparin for DVT prophylaxis, no PPI indicated, bowel protocol as needed

## 2018-12-06 NOTE — ED PROVIDER NOTES
"ED Provider Note    Scribed for Tye Chaparro M.D. by Vinod Zarate. 12/6/2018, 2:11 AM.    Primary care provider: NANCY Cartagena  Means of arrival: Walk in  History obtained from: Patient  History limited by: None    CHIEF COMPLAINT  Chief Complaint   Patient presents with   • Digit Pain     left thumb swelling        HPI  Hamlet Islas is a 33 y.o. RHD male with history of polysubstance abuse, bipolar disorder and asthma who presents to the Emergency Department for evaluation of left thumb swelling and pain onset 3-4 days ago after reportedly getting a small piece of metal lodged in his thumb following a crush injury.  He reports prior to the crush injury, he developed a wound with associated purulent drainage at the base of his left thumb. His pain is exacerbated by movement. The patient became concerned tonight when he noticed the swelling had severely increased in severity. He also has developed associated numbness across the entire dorsal aspect of his left hand and streaking up his left upper extremity. He has prior history of IV use, but denies injecting drugs in the affected area. The patient states he used \"Citlaly\" tonight. He is negative for associated fever, chest pain, shortness of breath.     REVIEW OF SYSTEMS  Pertinent positives include left thumb swelling, left thumb pain, hand numbness, and left arm streaking. Pertinent negatives include no fever. As above, all other systems reviewed and are negative.   See HPI for further details.     PAST MEDICAL HISTORY   has a past medical history of Asthma; Bipolar disorder ([facility]); Bipolar disorder with depression (HCC) (9/8/2014); Chronic hip pain (9/8/2014); Chronic low back pain; Migraine; Neuropathy ([facility]); Obsessive compulsive disorder; Post traumatic stress disorder (PTSD); Psoriasis; Schizoaffective disorder ([facility]); Scoliosis (9/8/2014); and Tendinitis of foot.    SURGICAL HISTORY   has a past surgical history that includes other.    SOCIAL " HISTORY  Social History   Substance Use Topics   • Smoking status: Current Every Day Smoker     Packs/day: 0.50     Types: Cigarettes   • Smokeless tobacco: Never Used   • Alcohol use Yes      Comment: rare      History   Drug Use     Comment: meth and marijuana       FAMILY HISTORY  Family History   Problem Relation Age of Onset   • Other Mother         discoid lupus   • Cancer Mother         leukemia   • Hyperlipidemia Mother    • Hypertension Mother    • Cancer Father         lung       CURRENT MEDICATIONS  Home Medications    **Home medications have not yet been reviewed for this encounter**         ALLERGIES  No Known Allergies    PHYSICAL EXAM  VITAL SIGNS: /72   Pulse (!) 118   Temp 36.7 °C (98.1 °F) (Temporal)   Resp 16   Wt 79.4 kg (175 lb 0.7 oz)   SpO2 95%   BMI 25.12 kg/m²   Vitals reviewed.  Constitutional: Alert in no apparent distress.  HENT: No signs of trauma, Bilateral external ears normal, Nose normal. Dry mucous membranes.  Eyes: Pupils are equal and reactive, Conjunctiva normal, Non-icteric.   Neck: Normal range of motion, No tenderness, Supple, No stridor.   Lymphatic: No lymphadenopathy noted.   Cardiovascular: Tachycardic with regular rhythm, no murmurs.   Thorax & Lungs: Normal breath sounds, No respiratory distress, No wheezing, No chest tenderness.   Abdomen: Bowel sounds normal, Soft, No tenderness, No peritoneal signs, No masses, No pulsatile masses.   Skin: Warm, Dry, No erythema, No rash.   Back: Normal alignment.   Extremities: Large bulla extending over entire left thumb with inability to move the digit. Left hand is edematous with erythema and tenderness along entire dorsum, able to flex and extend the MCP, PIP, and DIP joints of 2nd, 3rd, 4th, and 5th digits. Severe pain along left thumb, open wound at base of left thumb draining purulent material. Erythematous streaks up the volar surface of the left arm towards the AC region.  Intact distal pulses, Significant  "tenderness along erythematous streaking up the volar aspect of left arm. No cyanosis. No crepitus.  Musculoskeletal: Good range of motion in all major joints. No major deformities noted.   Neurologic: Alert, Normal motor function, Decreased sensation along dorsum of left hand, No focal deficits noted.   Psychiatric: Poor eye contact, poor insight and judgment.      DIAGNOSTIC STUDIES / PROCEDURES    LABS  Labs Reviewed   CBC WITH DIFFERENTIAL - Abnormal; Notable for the following:        Result Value    WBC 14.2 (*)     MCHC 35.6 (*)     MPV 8.9 (*)     Lymphocytes 19.20 (*)     Neutrophils (Absolute) 9.78 (*)     Monos (Absolute) 1.60 (*)     All other components within normal limits    Narrative:     Indicate which anticoagulants the patient is on:->NONE   COMP METABOLIC PANEL - Abnormal; Notable for the following:     Sodium 134 (*)     Potassium 3.3 (*)     Globulin 3.9 (*)     All other components within normal limits    Narrative:     Indicate which anticoagulants the patient is on:->NONE   PROTHROMBIN TIME    Narrative:     Indicate which anticoagulants the patient is on:->NONE   APTT    Narrative:     Indicate which anticoagulants the patient is on:->NONE   LACTIC ACID    Narrative:     Indicate which anticoagulants the patient is on:->NONE   BLOOD CULTURE    Narrative:     Per Hospital Policy: Only change Specimen Src: to \"Line\" if  specified by physician order.   BLOOD CULTURE    Narrative:     Per Hospital Policy: Only change Specimen Src: to \"Line\" if  specified by physician order.   ESTIMATED GFR    Narrative:     Indicate which anticoagulants the patient is on:->NONE      All labs reviewed by me.    RADIOLOGY  CT-HAND WITH LEFT   Final Result      1.  Extensive subcutaneous fluid along the extensor aspect of the left thumb which may represent edema or hematoma.      2.  Extensive fluid or edema overlying the extensor aspect of the left hand and wrist.      3.  No acute hand fracture or dislocation.    "     The radiologist's interpretation of all radiological studies have been reviewed by me.    COURSE & MEDICAL DECISION MAKING  Nursing notes, VS, PMSFHx reviewed in chart.  Differential diagnoses include but not limited to: necrotizing fascitis, cellulitis, lymphangitis, abscess, flexor tenosynovitis.      2:11 AM Patient seen and examined at bedside. Patient arrives tachycardic but afebrile with otherwise normal vital signs. Patient appears dehydrated with dry mucous membranes but non-toxic. The physical exam is remarkable for large bulla over entire left thumb and erythematous streaking up the volar surface of the left arm concerning for lymphangitis. Patient has a history of substance abuse and underlying psychiatric disease. Here, he is a very poor historian, refuses to make eye contact, and reports using ecstasy earlier tonight. Despite that, he has an obvious significant hand infection. I have a slight concern for potential necrotizing fasciitis so will cover with vancomycin and clindamycin initially. Ordered for CT-Hand, Estimated GFR, Lactic Acid, Blood Culture x2, CBC with differential, CMP, PT/INR, and PTT to evaluate. Patient will be treated with Vancomycin 2000 mg and Cleocin 600 mg for his symptoms.  IV fluids started for tachycardia, dehydration, and potential sepsis.    2:52 AM - Patient reports to be experiencing a large amount of pain in his left thumb. He will receive Zofran 4 mg and Morphine 4 mg for pain management. Upon reevaluation, the patient's hand infection appears unchanged. This is reassuring against a fast moving infection like necrotizing fasciitis.    CBC reveals leukocytosis to 14.2. No anemia. No bandemia. Mild hyponatremia to 134 and hypokalemia to 3.3. Normal renal function. No transaminitis. Lactic acid is normal. Diagnostic alcohol is normal.    4:29 AM - Patient reevaluated at bedside. He reports his pain has improved. The patient was informed we will continue to await CT  imaging results.     4:45 AM - CT reveals extensive subcutaneous fluid along the extensor aspect of the left thumb as will as fluid or edema over the extensor aspect of left hand and wrist. No gas formation noted.    4:46 AM - Reviewed patient's lab and radiology results as shown above.     4:48 AM - Paged Orthopedics.     4:56 AM - Consult with Dr. Mendoza, Orthopedics, who recommends OR for debridement and hospitalist admission.    5:01 AM - Consult with Dr. Aguilera, Hospitalist, who agrees to admit the patient.      DISPOSITION:  Patient will be admitted to [facility], in guarded condition.    FINAL IMPRESSION  1. Infection of hand    2. Sepsis, due to unspecified organism (HCC)          Vinod HAMLIN (Scribe), am scribing for, and in the presence of, Tye Chaparro M.D..    Electronically signed by: Vinod Zarate (Scribe), 12/6/2018    Tye HAMLIN M.D. personally performed the services described in this documentation, as scribed by Vinod Zarate in my presence, and it is both accurate and complete.    C.    The note accurately reflects work and decisions made by me.  Tye Chaparro  12/7/2018  11:26 AM

## 2018-12-06 NOTE — ASSESSMENT & PLAN NOTE
No clear antecedent event  Infectious disease following--appreciate the assistance  History of MRSA  IV abd while IP  OP Clinda per ID  OP wound care via HH. Can dc when arranged

## 2018-12-06 NOTE — OR NURSING
Per ER report patient was declared competent to make decisions. He later became agitated and was given haldol.  In pre-op patient was very sedated, but arousable to painful stimuli.  Patient signed anesthesia consent himself, but was not willing to hold pen to sign surgical consent. Patient verbalized he was okay with surgery on his left thumb, consent signed by two RNs.    Patient refused to remove pants, he went back to OR with pants in place.

## 2018-12-06 NOTE — OR NURSING
Pt calm and sleeping most of the time. Pt expressed that he was comfortable at this time. Left hand/thumb elevated on two pillows and ice pack applied. Pt had sips of water with no nausea or vomiting. Report given to JORJE Lima. Pt transferred to the back of PACU 11A. Awaiting room assignment.

## 2018-12-06 NOTE — CONSULTS
DATE OF SERVICE:  2018    ORTHOPEDIC CONSULTATION    REQUESTING PHYSICIAN:  Dr. Chaparro, emergency department.    REASON FOR CONSULTATION:  Left thumb pain and swelling, concern for infection.    CHIEF COMPLAINT:  Left thumb pain.    HISTORY OF PRESENT ILLNESS:  The patient is a 33-year-old male.  He has a   history of bipolar disorder as well as asthma.  He presented to the emergency   department this morning for left thumb pain for about the last 4-5 days.  He   states he had a cut on his thumb following a crush injury.  He states he also   kind of scratch that a little bit.  It became increasingly more painful and   swollen at the base of his left thumb.  He denies any other significant   issues. He does have a history of IV drug abuse, but denies having injected   anything in this area.    PAST MEDICAL HISTORY:  ALLERGIES:  No known drug allergies.    OUTPATIENT MEDICATIONS:  Include naproxen, Keppra, and buspirone.    PAST MEDICAL DIAGNOSES:  Include scoliosis, schizoaffective disorder,   psoriasis, PTSD, neuropathy, chronic back and hip pain, and bipolar disorder   with depression as well as asthma.    PAST SURGICAL HISTORY:  Only oral surgery.    FAMILY HISTORY:  His mother has discoid lupus and leukemia as well as   hyperlipidemia and hypertension and his father is  and had lung   cancer.    SOCIAL HISTORY:  Patient smokes half pack of cigarettes a day.  He drinks   alcohol rarely.  He does use methamphetamine and marijuana.    REVIEW OF SYSTEMS:  Patient denies fevers, chills, nausea, vomiting, shortness   of breath, chest pain; otherwise, normal per AMA criteria other than that are   stated in the HPI.    PHYSICAL EXAMINATION:  VITAL SIGNS:  Temperature is 98.1, heart rate 102, blood pressure 119/76, and   pulse oximetry 98% on room air.  GENERAL APPEARANCE:  Patient is alert.  He is oriented.  He is in no acute   distress.  HEAD, EYES, EARS, NOSE, AND THROAT:  Normocephalic and atraumatic.   Mucous   membranes are moist.  PULMONARY:  Symmetric, unlabored breathing.  CARDIOVASCULAR:  Extremities well perfused.  No obvious JVD is noted.  ABDOMEN:  Thin, nondistended.  MUSCULOSKELETAL:  Left upper extremity, he has moderate amount of swelling to   the thumb.  There is some decompressed serious blistering covering the entire   dorsal aspect of the thumb without significant erythema.  He is exquisitely   tender to palpation at the base of the thumb, but he is able to flex and   extend it without significant discomfort.  He has no axial loading pain at the   wrist or the thumb.  He is able to flex the rest of his fingers.  There is no   significant tracking proximal erythema, but some induration at the dorsal   radial aspect of the distal forearm.  He has full elbow range of motion.    There is no evidence of obvious traumatic deformity in his bilateral lower   extremities or his right upper extremity, which are grossly neurovascularly   intact.  He has some superficial excoriations on the right thigh.    DIAGNOSTIC IMAGING:  CT of the left hand with contrast shows extensive   subcutaneous fluid along the extensor aspect of the thumb and no evidence of   retained radiopaque foreign body, fracture, dislocation, or destructive bony   process.    LABORATORY DATA:  White blood cell count is 14.2.    ASSESSMENT:  A 33-year-old male with left thumb infection involving the   extensor tendon sheath likely consistent with an infectious tenosynovitis   versus abscess.  He has psychiatric disorder and history of polysubstance   abuse and is being evaluated for admission to the hospitalist service.    PLAN:  1.  I discussed these findings with the patient, recommend going to operating   room for incision and drainage of this fluid collection due to concern for   infection.  We discussed risks of surgery and the pros and cons of operative   and nonoperative management and he expressed understanding and wished to   proceed  with surgery when possible.  2.  Patient should continue to be n.p.o. and I will try to get him to the   operating room this morning for surgical incision and drainage for the   infection.       ____________________________________     MD RUBY Presley / DIAMOND    DD:  12/06/2018 09:13:41  DT:  12/06/2018 09:37:29    D#:  [id number]  Job#:  [id number]

## 2018-12-06 NOTE — DISCHARGE PLANNING
"Per Rashad Tavarez, he was seen to determine if he needs to be on a hold.  Unable to complete an assessment due to his drowsiness. Dosed off after each question. Denies S/I or H/I. States he does have a history of mental illness and does admit that he was at [facility] \"a long time ago\".  Unable to put on a legal hold at this time .  Will be admitted to the hospital.  "

## 2018-12-07 LAB
AMPHET UR QL SCN: POSITIVE
ANION GAP SERPL CALC-SCNC: 6 MMOL/L (ref 0–11.9)
BARBITURATES UR QL SCN: NEGATIVE
BENZODIAZ UR QL SCN: NEGATIVE
BUN SERPL-MCNC: 9 MG/DL (ref 8–22)
BZE UR QL SCN: NEGATIVE
CALCIUM SERPL-MCNC: 7.8 MG/DL (ref 8.5–10.5)
CANNABINOIDS UR QL SCN: NEGATIVE
CHLORIDE SERPL-SCNC: 107 MMOL/L (ref 96–112)
CO2 SERPL-SCNC: 24 MMOL/L (ref 20–33)
CREAT SERPL-MCNC: 0.87 MG/DL (ref 0.5–1.4)
CRP SERPL HS-MCNC: 6.65 MG/DL (ref 0–0.75)
ERYTHROCYTE [DISTWIDTH] IN BLOOD BY AUTOMATED COUNT: 41.6 FL (ref 35.9–50)
ERYTHROCYTE [SEDIMENTATION RATE] IN BLOOD BY WESTERGREN METHOD: 10 MM/HOUR (ref 0–15)
GLUCOSE SERPL-MCNC: 108 MG/DL (ref 65–99)
HBV CORE AB SERPL QL IA: NEGATIVE
HCT VFR BLD AUTO: 39.3 % (ref 42–52)
HCV AB SER QL: NEGATIVE
HGB BLD-MCNC: 13.2 G/DL (ref 14–18)
HIV 1+2 AB+HIV1 P24 AG SERPL QL IA: NON REACTIVE
MCH RBC QN AUTO: 30.1 PG (ref 27–33)
MCHC RBC AUTO-ENTMCNC: 33.6 G/DL (ref 33.7–35.3)
MCV RBC AUTO: 89.7 FL (ref 81.4–97.8)
METHADONE UR QL SCN: NEGATIVE
OPIATES UR QL SCN: NEGATIVE
OXYCODONE UR QL SCN: NEGATIVE
PCP UR QL SCN: NEGATIVE
PLATELET # BLD AUTO: 362 K/UL (ref 164–446)
PMV BLD AUTO: 8.7 FL (ref 9–12.9)
POTASSIUM SERPL-SCNC: 4 MMOL/L (ref 3.6–5.5)
PROPOXYPH UR QL SCN: NEGATIVE
RBC # BLD AUTO: 4.38 M/UL (ref 4.7–6.1)
SODIUM SERPL-SCNC: 137 MMOL/L (ref 135–145)
WBC # BLD AUTO: 7.3 K/UL (ref 4.8–10.8)

## 2018-12-07 PROCEDURE — 86704 HEP B CORE ANTIBODY TOTAL: CPT

## 2018-12-07 PROCEDURE — 86140 C-REACTIVE PROTEIN: CPT

## 2018-12-07 PROCEDURE — 700111 HCHG RX REV CODE 636 W/ 250 OVERRIDE (IP): Performed by: HOSPITALIST

## 2018-12-07 PROCEDURE — 80048 BASIC METABOLIC PNL TOTAL CA: CPT

## 2018-12-07 PROCEDURE — A9270 NON-COVERED ITEM OR SERVICE: HCPCS | Performed by: NURSE PRACTITIONER

## 2018-12-07 PROCEDURE — 85027 COMPLETE CBC AUTOMATED: CPT

## 2018-12-07 PROCEDURE — G0475 HIV COMBINATION ASSAY: HCPCS

## 2018-12-07 PROCEDURE — 700105 HCHG RX REV CODE 258: Performed by: HOSPITALIST

## 2018-12-07 PROCEDURE — 85652 RBC SED RATE AUTOMATED: CPT

## 2018-12-07 PROCEDURE — 700102 HCHG RX REV CODE 250 W/ 637 OVERRIDE(OP): Performed by: NURSE PRACTITIONER

## 2018-12-07 PROCEDURE — 700102 HCHG RX REV CODE 250 W/ 637 OVERRIDE(OP): Performed by: HOSPITALIST

## 2018-12-07 PROCEDURE — 99223 1ST HOSP IP/OBS HIGH 75: CPT | Performed by: INTERNAL MEDICINE

## 2018-12-07 PROCEDURE — 770006 HCHG ROOM/CARE - MED/SURG/GYN SEMI*

## 2018-12-07 PROCEDURE — 99233 SBSQ HOSP IP/OBS HIGH 50: CPT | Performed by: INTERNAL MEDICINE

## 2018-12-07 PROCEDURE — 80307 DRUG TEST PRSMV CHEM ANLYZR: CPT

## 2018-12-07 PROCEDURE — A9270 NON-COVERED ITEM OR SERVICE: HCPCS | Performed by: HOSPITALIST

## 2018-12-07 PROCEDURE — 36415 COLL VENOUS BLD VENIPUNCTURE: CPT

## 2018-12-07 PROCEDURE — 86803 HEPATITIS C AB TEST: CPT

## 2018-12-07 RX ORDER — RISPERIDONE 0.5 MG/1
1 TABLET ORAL 2 TIMES DAILY
Status: DISCONTINUED | OUTPATIENT
Start: 2018-12-07 | End: 2018-12-09

## 2018-12-07 RX ADMIN — HEPARIN SODIUM 5000 UNITS: 5000 INJECTION, SOLUTION INTRAVENOUS; SUBCUTANEOUS at 15:09

## 2018-12-07 RX ADMIN — AMPICILLIN SODIUM AND SULBACTAM SODIUM 3 G: 2; 1 INJECTION, POWDER, FOR SOLUTION INTRAMUSCULAR; INTRAVENOUS at 17:11

## 2018-12-07 RX ADMIN — AMPICILLIN SODIUM AND SULBACTAM SODIUM 3 G: 2; 1 INJECTION, POWDER, FOR SOLUTION INTRAMUSCULAR; INTRAVENOUS at 00:30

## 2018-12-07 RX ADMIN — LEVETIRACETAM 500 MG: 500 TABLET ORAL at 17:13

## 2018-12-07 RX ADMIN — LEVETIRACETAM 500 MG: 500 TABLET ORAL at 06:11

## 2018-12-07 RX ADMIN — RISPERIDONE 1 MG: 1 TABLET ORAL at 20:48

## 2018-12-07 RX ADMIN — NICOTINE POLACRILEX 2 MG: 2 GUM, CHEWING BUCCAL at 10:16

## 2018-12-07 RX ADMIN — VANCOMYCIN HYDROCHLORIDE 1300 MG: 100 INJECTION, POWDER, LYOPHILIZED, FOR SOLUTION INTRAVENOUS at 15:16

## 2018-12-07 RX ADMIN — STANDARDIZED SENNA CONCENTRATE AND DOCUSATE SODIUM 2 TABLET: 8.6; 5 TABLET, FILM COATED ORAL at 06:11

## 2018-12-07 RX ADMIN — AMPICILLIN SODIUM AND SULBACTAM SODIUM 3 G: 2; 1 INJECTION, POWDER, FOR SOLUTION INTRAMUSCULAR; INTRAVENOUS at 06:28

## 2018-12-07 RX ADMIN — ACETAMINOPHEN 650 MG: 325 TABLET, FILM COATED ORAL at 18:29

## 2018-12-07 RX ADMIN — AMPICILLIN SODIUM AND SULBACTAM SODIUM 3 G: 2; 1 INJECTION, POWDER, FOR SOLUTION INTRAMUSCULAR; INTRAVENOUS at 23:34

## 2018-12-07 RX ADMIN — AMPICILLIN SODIUM AND SULBACTAM SODIUM 3 G: 2; 1 INJECTION, POWDER, FOR SOLUTION INTRAMUSCULAR; INTRAVENOUS at 11:53

## 2018-12-07 RX ADMIN — SODIUM CHLORIDE: 9 INJECTION, SOLUTION INTRAVENOUS at 11:54

## 2018-12-07 RX ADMIN — NICOTINE 14 MG: 14 PATCH, EXTENDED RELEASE TRANSDERMAL at 10:47

## 2018-12-07 RX ADMIN — VANCOMYCIN HYDROCHLORIDE 1300 MG: 100 INJECTION, POWDER, LYOPHILIZED, FOR SOLUTION INTRAVENOUS at 04:14

## 2018-12-07 RX ADMIN — HEPARIN SODIUM 5000 UNITS: 5000 INJECTION, SOLUTION INTRAVENOUS; SUBCUTANEOUS at 20:49

## 2018-12-07 RX ADMIN — HEPARIN SODIUM 5000 UNITS: 5000 INJECTION, SOLUTION INTRAVENOUS; SUBCUTANEOUS at 06:12

## 2018-12-07 RX ADMIN — OXYCODONE HYDROCHLORIDE 5 MG: 5 TABLET ORAL at 20:48

## 2018-12-07 ASSESSMENT — PAIN SCALES - GENERAL
PAINLEVEL_OUTOF10: 9
PAINLEVEL_OUTOF10: 5
PAINLEVEL_OUTOF10: 0

## 2018-12-07 NOTE — ASSESSMENT & PLAN NOTE
Patient has missed numerous appointments with outpatient PCP and pain management provider  He has been unwilling or unable to stop using multiple drugs of abuse  He continues to smoke tobacco

## 2018-12-07 NOTE — PROGRESS NOTES
"Pharmacy Kinetics 33 y.o. male on vancomycin day # 2  2018    Currently on Vancomycin 1300mg IV q12h   (0330, 1530)    Indication for Treatment: SSTI     Pertinent history per medical record: Admitted on 2018 for finger pain with associated swelling. Patient reports purulent drainage - imaging demonstrated fluid collection. With concern for infection, empiric antibiotic therapy initiated and orthopedic consultation to be obtained. I&D performed . PMH of IV drug abuse.      Other antibiotics: Ampicillin/sulbactam 3 g IV q6h     Allergies: Patient has no known allergies.      List concerns for renal function: CT with contrast 18    Pertinent cultures to date:   18: Blood x2 - NGTD  18: L thumb wound - in process    Recent Labs      18   0235  18   0430   WBC  14.2*  7.3   NEUTSPOLYS  68.70   --      Recent Labs      18   0235  18   0430   BUN  9  9   CREATININE  1.12  0.87   ALBUMIN  4.1   --      No results for input(s): VANCOTROUGH, VANCOPEAK, VANCORANDOM in the last 72 hours.  Intake/Output Summary (Last 24 hours) at 18 1154  Last data filed at 18 1016   Gross per 24 hour   Intake                0 ml   Output              700 ml   Net             -700 ml      Blood pressure (!) 92/58, pulse 62, temperature 37 °C (98.6 °F), temperature source Oral, resp. rate 16, height 1.778 m (5' 10\"), weight 79.4 kg (175 lb 0.7 oz), SpO2 98 %. Temp (24hrs), Av.6 °C (97.8 °F), Min:36.2 °C (97.2 °F), Max:37 °C (98.6 °F)      A/P   1. Vancomycin dose change: none  2. Next vancomycin level: tomorrow, 18, at 0300  (ordered)  3. Goal trough: 12-16 mcg/mL  4. Comments: Cultures from the wound have been obtained and are in process. No leukocytosis and afebrile. Renal function appears stable per renal indices. Continue current dose. Trough level for prior to 5th dose ordered to assess accumulation and dose appropriateness. Pharmacy to monitor cultures for possible " de-escalation.      Sarah Campos, PharmD., BCPS

## 2018-12-07 NOTE — PROGRESS NOTES
0725: Bedside report received.  Pt resting in bed, mumbles--requesting to sleep more.  Call light and personal belongings within reach.  Bed locked in lowest position.  Bed alarm on.    1015: Pts sister at bedside, updated on POC as requested by pt.  MD at bedside.  Social work consult placed for community resources for drug abuse.    1515: Micro called with critical organism group A strep and Staph from pts L thumb. Maranda notified. --MD Stark called back and will update medications/POC.

## 2018-12-07 NOTE — CONSULTS
ADULT INFECTIOUS DISEASE CONSULT     Date of Service: 12/7/2018    Consult Requested By: Soco Low M.D.    Reason for Consultation: Left thumb infection    History of Present Illness:   Hamlet Islas is a 33 y.o. male with history of bipolar disorder, anxiety, degenerative disc disease, tobacco abuse, meth and marijuana use had come into the emergency room on 11/29 wounds on his left hand and right thigh.  It was documented that patient has skin picking habit.  He was given Keflex.  He again presented to the emergency room on 12/6/2018 with left thumb infection.  He was apparently having left thumb pain for 4-5 days.  He was taken to the OR on 12/6/2018 and underwent excisional debridement.  On admission to the ER he had a WBC count of 14,000.  The CT scan had shown extensive subcutaneous fluid over the extensor aspect.  The OR cultures have been negative so far.    Review Of Systems:  Complains of pain in the left thumb.  Denies any fevers denies any chills.  Patient is almost somnolent.  Hence the review of systems database is incomplete        PMH:   Past Medical History:   Diagnosis Date   • Asthma    • Bipolar disorder ([facility])    • Bipolar disorder with depression (HCC) 9/8/2014   • Chronic hip pain 9/8/2014   • Chronic low back pain    • Migraine    • Neuropathy ([facility])    • Obsessive compulsive disorder    • Post traumatic stress disorder (PTSD)    • Psoriasis    • Schizoaffective disorder ([facility])    • Scoliosis 9/8/2014   • Tendinitis of foot     left foot, chronic         PSH:  Past Surgical History:   Procedure Laterality Date   • IRRIGATION & DEBRIDEMENT ORTHO Left 12/6/2018    Procedure: IRRIGATION & DEBRIDEMENT ORTHO;  Surgeon: Rusty Bianchi M.D.;  Location: SURGERY [facility];  Service: Orthopedics   • OTHER      oral       FAMILY HX:  Family History   Problem Relation Age of Onset   • Other Mother         discoid lupus   • Cancer Mother         leukemia   • Hyperlipidemia Mother    •  Hypertension Mother    • Cancer Father         lung       SOCIAL HX:  Social History     Social History   • Marital status: Single     Spouse name: N/A   • Number of children: N/A   • Years of education: N/A     Occupational History   • Not on file.     Social History Main Topics   • Smoking status: Current Every Day Smoker     Packs/day: 0.50     Types: Cigarettes   • Smokeless tobacco: Never Used   • Alcohol use Yes      Comment: rare   • Drug use: Yes      Comment: meth and marijuana   • Sexual activity: Yes     Partners: Female     Other Topics Concern   • Not on file     Social History Narrative    ** Merged History Encounter **          History   Smoking Status   • Current Every Day Smoker   • Packs/day: 0.50   • Types: Cigarettes   Smokeless Tobacco   • Never Used     History   Alcohol Use   • Yes     Comment: rare       Allergies/Intolerances:  No Known Allergies    History reviewed from the chart    Other Current Medications:    Current Facility-Administered Medications:   •  risperiDONE (RISPERDAL) tablet 3 mg, 3 mg, Oral, BID, Rachell Aguilera M.D., 3 mg at 12/06/18 2111  •  levETIRAcetam (KEPPRA) tablet 500 mg, 500 mg, Oral, BID, Rachell Aguilera M.D., 500 mg at 12/07/18 0611  •  senna-docusate (PERICOLACE or SENOKOT S) 8.6-50 MG per tablet 2 Tab, 2 Tab, Oral, BID, 2 Tab at 12/07/18 0611 **AND** polyethylene glycol/lytes (MIRALAX) PACKET 1 Packet, 1 Packet, Oral, QDAY PRN **AND** magnesium hydroxide (MILK OF MAGNESIA) suspension 30 mL, 30 mL, Oral, QDAY PRN **AND** bisacodyl (DULCOLAX) suppository 10 mg, 10 mg, Rectal, QDAY PRN, Rachell Aguilera M.D.  •  NS infusion, , Intravenous, Continuous, Rachell Aguilera M.D., Last Rate: 100 mL/hr at 12/06/18 2111  •  NS (BOLUS) infusion 500 mL, 500 mL, Intravenous, Once PRN, Rachell Aguilera M.D.  •  heparin injection 5,000 Units, 5,000 Units, Subcutaneous, Q8HRS, Rachell Aguilera M.D., 5,000 Units at 12/07/18 0612  •  ampicillin/sulbactam (UNASYN) 3 g in  mL  IVPB, 3 g, Intravenous, Q6HRS, Rachell Aguilera M.D., Stopped at 12/07/18 0729  •  MD Alert...Vancomycin per Pharmacy, 1 Each, Other, pharmacy to dose, Rachell Aguilera M.D.  •  nicotine (NICODERM) 14 MG/24HR 14 mg, 14 mg, Transdermal, Daily-0600, 14 mg at 12/06/18 1824 **AND** Nicotine Replacement Patient Education Materials, , , Once **AND** nicotine polacrilex (NICORETTE) 2 MG piece 2 mg, 2 mg, Oral, Q HOUR PRN, Rachell Aguilera M.D., 2 mg at 12/06/18 1824  •  ondansetron (ZOFRAN) syringe/vial injection 4 mg, 4 mg, Intravenous, Q4HRS PRN, Rachell Aguilera M.D.  •  ondansetron (ZOFRAN ODT) dispertab 4 mg, 4 mg, Oral, Q4HRS PRN, Rachell Aguilera M.D.  •  promethazine (PHENERGAN) tablet 12.5-25 mg, 12.5-25 mg, Oral, Q4HRS PRN, Rachell Aguilera M.D.  •  promethazine (PHENERGAN) suppository 12.5-25 mg, 12.5-25 mg, Rectal, Q4HRS PRN, Rachell Aguilera M.D.  •  prochlorperazine (COMPAZINE) injection 5-10 mg, 5-10 mg, Intravenous, Q4HRS PRN, Rachell Aguilera M.D.  •  acetaminophen (TYLENOL) tablet 650 mg, 650 mg, Oral, Q6HRS PRN, Rachell Aguilera M.D.  •  Notify provider if pain remains uncontrolled, , , CONTINUOUS **AND** Use the numeric rating scale (NRS-11) on regular floors and Critical-Care Pain Observation Tool (CPOT) on ICUs/Trauma to assess pain, , , CONTINUOUS **AND** Pulse Ox (Oximetry), , , CONTINUOUS **AND** Pharmacy Consult Request ...Pain Management Review, , Other, PRN **AND** If patient difficult to arouse and/or has respiratory depression, stop any opiates that are currently infusing and call a Rapid Response., , , CONTINUOUS **AND** oxyCODONE immediate-release (ROXICODONE) tablet 2.5 mg, 2.5 mg, Oral, Q3HRS PRN **AND** oxyCODONE immediate-release (ROXICODONE) tablet 5 mg, 5 mg, Oral, Q3HRS PRN **AND** morphine (pf) 10 mg/mL injection 2 mg, 2 mg, Intravenous, Q3HRS PRN, Rachell Aguilera M.D.  •  vancomycin 1,300 mg in  mL IVPB, 17 mg/kg, Intravenous, Q12HR, Rachell Aguilera M.D., Stopped at 12/07/18 0614  •   "haloperidol lactate (HALDOL) injection 1 mg, 1 mg, Intramuscular, Q4HRS PRN, Soco Low M.D., 1 mg at 18 0901  [unfilled]    Most Recent Vital Signs:  /61   Pulse 69   Temp 36.9 °C (98.4 °F) (Oral)   Resp 15   Ht 1.778 m (5' 10\")   Wt 79.4 kg (175 lb 0.7 oz)   SpO2 97%   BMI 25.12 kg/m²   Temp  Av.6 °C (97.8 °F)  Min: 36.2 °C (97.2 °F)  Max: 36.9 °C (98.4 °F)    Physical Exam:  General: Tired but nontoxic  HEENT: sclera anicteric, PERRL edentulous  Neck: supple, no lymphadenopathy  Chest: CTAB, no r/r/w, normal work of breathing.  Cardiac: Regular, no murmurs no gallops heard  Abdomen: + bowel sounds, soft, non-tender, non-distended, no HSM  Extremities: Left hand is bandaged  Skin: no rashes or erythema  Neuro: Somnolent but appropriately responsive.  No focal neurological deficit  Pertinent Lab Results:  Recent Labs      18   0235  18   0430   WBC  14.2*  7.3      Recent Labs      18   0235  18   0430   HEMOGLOBIN  16.0  13.2*   HEMATOCRIT  45.0  39.3*   MCV  87.4  89.7   MCH  31.1  30.1   PLATELETCT  398  362         Recent Labs      18   0235  18   0430   SODIUM  134*  137   POTASSIUM  3.3*  4.0   CHLORIDE  98  107   CO2  26  24   CREATININE  1.12  0.87        Recent Labs      18   023   ALBUMIN  4.1        Pertinent Micro:  Results     Procedure Component Value Units Date/Time    MRSA BY PCR (AMP) [[id number]]     Order Status:  No result Specimen:  Respirate from Nares     GRAM STAIN [[id number]] Collected:  18 830    Order Status:  Completed Specimen:  Wound Updated:  18 2186     Significant Indicator .     Source WND     Site Left Thumb Infection     Gram Stain Result Rare WBCs.  No organisms seen.      ANAEROBIC CULTURE [[id number]] Collected:  18 948    Order Status:  Completed Specimen:  Other Updated:  18 1319    CULTURE WOUND W/ GRAM STAIN [[id number]] Collected:  18    Order Status:  Completed " "Specimen:  Other Updated:  12/06/18 1319    BLOOD CULTURE x2 [[id number]] Collected:  12/06/18 0230    Order Status:  Completed Specimen:  Blood from Peripheral Updated:  12/06/18 0335    Narrative:       Per Hospital Policy: Only change Specimen Src: to \"Line\" if  specified by physician order.    BLOOD CULTURE x2 [[id number]] Collected:  12/06/18 0235    Order Status:  Completed Specimen:  Blood from Peripheral Updated:  12/06/18 0334    Narrative:       Per Hospital Policy: Only change Specimen Src: to \"Line\" if  specified by physician order.        No results found for: BLOODCULTU, BLDCULT, BCHOLD     Studies:  Ct-hand With Left    Result Date: 12/6/2018 12/6/2018 3:36 AM HISTORY/REASON FOR EXAM:  Crush injury to left hand with swelling and discoloration of left thumb. TECHNIQUE/ EXAM DESCRIPTION AND NUMBER OF VIEWS: CT scan of the LEFT hand with contrast, with reconstructions. Thin-section noncontrast helical images were obtained from the distal radius/ulna through the proximal metacarpals. Coronal and sagittal reconstructions were generated from the axial images. A total of 80 mL of Omnipaque 350 nonionic contrast was administered IV without complication. Up to date radiation dose reduction adjustments have been utilized to meet ALARA standards for radiation dose reduction. COMPARISON:  None. FINDINGS: No hand or wrist fracture or dislocation is identified. There is diffuse edema overlying the extensor aspect of the hand and wrist. There is soft tissue swelling along the extensor aspect of the left thumb diffusely consistent with hematoma or edema.     1.  Extensive subcutaneous fluid along the extensor aspect of the left thumb which may represent edema or hematoma. 2.  Extensive fluid or edema overlying the extensor aspect of the left hand and wrist. 3.  No acute hand fracture or dislocation.  IMPRESSION:     Left thumb infection  Methamphetamine use  Tobacco abuse    PLAN:   Hamlet Islas is a 33 y.o. " -American male with meth use and tobacco use admitted with left thumb infection.  Underwent I&D.  Apparently patient has history of MRSA.  Although we do not have any documented cultures in the EPIC.  Check sed rate, CRP, HIV and hepatitis C. continue with the supportive measures.  For now continue with the Vanco and Unasyn and follow the cultures.  Hopefully we will be able to change him to oral soon.      Discussed with IM. Will continue to follow    Bernadine Asif M.D.

## 2018-12-07 NOTE — ASSESSMENT & PLAN NOTE
Was taking risperidone and BuSpar as an outpatient with erratic filling pattern.  Reconciled with outpatient pharmacy  Restarted risperidone, but patient extremely lethargic. DC  Possible seroquel

## 2018-12-07 NOTE — PROGRESS NOTES
[facility] Hospitalist Progress Note    Date of Service: 2018    Chief Complaint  33 y.o. male admitted 2018 with cellulitis and abrasion of the left hand    Interval Problem Update  Cellulitis--continue Unasyn and vancomycin; cultures showing S. aureus and group A strep; sensitivities pending; ID following  Abrasion/Wound --- wound c/s  Polysubstance abuse--reports recent methamphetamine and MDMA use; UDS pending  Tobacco abuse--refuses to quit  Noncompliance-- referral to outpatient complex care management  Bipolar disorder-- does not appear patient is taking anything for this other than risperidone and BuSpar; his fill pattern for both has been erratic at a local pharmacy of record  History of seizure-- review of records reveals this was related to methamphetamine withdrawal versus EtOH; continue Keppra for now    Consultants/Specialty  Infectious disease    Disposition  Guarded        Review of Systems   Unable to perform ROS: Mental acuity      Physical Exam  Laboratory/Imaging   Hemodynamics  Temp (24hrs), Av.6 °C (97.8 °F), Min:36.2 °C (97.2 °F), Max:37 °C (98.6 °F)   Temperature: 36.6 °C (97.9 °F)  Pulse  Av.9  Min: 60  Max: 118    Blood Pressure: (!) 95/59      Respiratory      Respiration: 18, Pulse Oximetry: 98 %             Fluids    Intake/Output Summary (Last 24 hours) at 18 1440  Last data filed at 18 1400   Gross per 24 hour   Intake              120 ml   Output              700 ml   Net             -580 ml       Nutrition  Orders Placed This Encounter   Procedures   • Diet Order Regular     Standing Status:   Standing     Number of Occurrences:   1     Order Specific Question:   Diet:     Answer:   Regular [1]     Physical Exam   Constitutional: Vital signs are normal. He appears well-developed and well-nourished. He appears lethargic. He is sleeping. No distress.   HENT:   Head: Normocephalic and atraumatic.   Eyes: Pupils are equal, round, and reactive to light. EOM are  normal.   Neck: Neck supple.   Cardiovascular: Normal rate, regular rhythm and normal heart sounds.    Pulmonary/Chest: Effort normal and breath sounds normal. No respiratory distress. He has no wheezes. He has no rales.   Abdominal: Soft. He exhibits no distension. There is no tenderness.   Neurological: He appears lethargic. GCS eye subscore is 4. GCS verbal subscore is 4. GCS motor subscore is 5.   Skin: Skin is warm and dry.   Psychiatric: His speech is delayed and slurred. He is slowed and withdrawn.       Recent Labs      12/06/18   0235  12/07/18   0430   WBC  14.2*  7.3   RBC  5.15  4.38*   HEMOGLOBIN  16.0  13.2*   HEMATOCRIT  45.0  39.3*   MCV  87.4  89.7   MCH  31.1  30.1   MCHC  35.6*  33.6*   RDW  39.2  41.6   PLATELETCT  398  362   MPV  8.9*  8.7*     Recent Labs      12/06/18 0235  12/07/18   0430   SODIUM  134*  137   POTASSIUM  3.3*  4.0   CHLORIDE  98  107   CO2  26  24   GLUCOSE  98  108*   BUN  9  9   CREATININE  1.12  0.87   CALCIUM  9.1  7.8*     Recent Labs      12/06/18 0235   APTT  36.0   INR  1.11                  Assessment/Plan     * Infected abrasion of hand, left, initial encounter   Assessment & Plan    No clear antecedent event  Patient still lethargic and sedated to provide a reliable history  Infectious disease following--appreciate the assistance  Continue IV antibiotics for now  History of MRSA  MRSA nasal swab pending       SIRS (systemic inflammatory response syndrome) ([facility])   Assessment & Plan    Treatment as noted above.     Polysubstance abuse ([facility])   Assessment & Plan    Reports using methamphetamine 2-3 days ago  Reports using MDMA in the last several days  History of chronic pain with multiple procedures in the past     Noncompliance- (present on admission)   Assessment & Plan    Patient has missed numerous appointments with outpatient PCP and pain management provider  He has been unwilling or unable to stop using multiple drugs of abuse  He continues to smoke  tobacco       Hx of seizure disorder- (present on admission)   Assessment & Plan    Review of the records back to 2014 shows the patient has not been seen for seizure   He reports a seizure withdrawing from methamphetamine  No clear history of seizure disorder  We will resume his outpatient Keppra, however, given his polysubstance abuse and lower seizure threshold     Bipolar disorder, mixed (HCC)- (present on admission)   Assessment & Plan    Was taking risperidone and BuSpar as an outpatient with erratic filling pattern.  Reconciled with outpatient pharmacy     Tobacco abuse   Assessment & Plan    He refuses to quit at this time       Quality-Core Measures   Reviewed items::  Labs reviewed and Medications reviewed  Kennedy catheter::  No Kennedy  DVT prophylaxis pharmacological::  Heparin

## 2018-12-07 NOTE — PROGRESS NOTES
Patient extremely drowsy this shift, falling asleep during conversations and while eating. VSS this shift. Frequent checks throughout shift for safety and comfort bed alarm on for safety.

## 2018-12-07 NOTE — CARE PLAN
Problem: Safety  Goal: Will remain free from falls  Outcome: PROGRESSING AS EXPECTED  No falls this shift, bed alarm on for safety.

## 2018-12-07 NOTE — PROGRESS NOTES
Pt arrived to floor on [facility] by RN delivery. Pt dressing to left hand CDI. Pt awake, aleet, VSS. Pt reports wanting to leave. RN educated that pt would benefit from MD recommendations and care

## 2018-12-07 NOTE — PROGRESS NOTES
· 2 RN skin check complete with Vanessa RECINOS.   · Devices in place SCDs.  · Skin assessed under devices intact.  · No confirmed pressure ulcers found.   · Generalized scabbing and scaring to BLE and BUE, large callous' to balls of bilateral feet and left heel.   · The following interventions in place: patient educated and encouraged to reposition frequently, moisturizer also encouraged.

## 2018-12-08 PROBLEM — R65.10 SIRS (SYSTEMIC INFLAMMATORY RESPONSE SYNDROME) (HCC): Status: RESOLVED | Noted: 2018-12-06 | Resolved: 2018-12-08

## 2018-12-08 LAB
BACTERIA WND AEROBE CULT: ABNORMAL
GRAM STN SPEC: ABNORMAL
SIGNIFICANT IND 70042: ABNORMAL
SITE SITE: ABNORMAL
SOURCE SOURCE: ABNORMAL
VANCOMYCIN TROUGH SERPL-MCNC: 8.9 UG/ML (ref 10–20)

## 2018-12-08 PROCEDURE — 700102 HCHG RX REV CODE 250 W/ 637 OVERRIDE(OP): Performed by: NURSE PRACTITIONER

## 2018-12-08 PROCEDURE — A9270 NON-COVERED ITEM OR SERVICE: HCPCS | Performed by: NURSE PRACTITIONER

## 2018-12-08 PROCEDURE — 99232 SBSQ HOSP IP/OBS MODERATE 35: CPT | Performed by: INTERNAL MEDICINE

## 2018-12-08 PROCEDURE — 700105 HCHG RX REV CODE 258: Performed by: HOSPITALIST

## 2018-12-08 PROCEDURE — 700102 HCHG RX REV CODE 250 W/ 637 OVERRIDE(OP): Performed by: HOSPITALIST

## 2018-12-08 PROCEDURE — 36415 COLL VENOUS BLD VENIPUNCTURE: CPT

## 2018-12-08 PROCEDURE — 770006 HCHG ROOM/CARE - MED/SURG/GYN SEMI*

## 2018-12-08 PROCEDURE — A9270 NON-COVERED ITEM OR SERVICE: HCPCS | Performed by: HOSPITALIST

## 2018-12-08 PROCEDURE — 80202 ASSAY OF VANCOMYCIN: CPT

## 2018-12-08 PROCEDURE — 700111 HCHG RX REV CODE 636 W/ 250 OVERRIDE (IP): Performed by: HOSPITALIST

## 2018-12-08 RX ADMIN — AMPICILLIN SODIUM AND SULBACTAM SODIUM 3 G: 2; 1 INJECTION, POWDER, FOR SOLUTION INTRAMUSCULAR; INTRAVENOUS at 18:06

## 2018-12-08 RX ADMIN — AMPICILLIN SODIUM AND SULBACTAM SODIUM 3 G: 2; 1 INJECTION, POWDER, FOR SOLUTION INTRAMUSCULAR; INTRAVENOUS at 13:07

## 2018-12-08 RX ADMIN — SODIUM CHLORIDE: 9 INJECTION, SOLUTION INTRAVENOUS at 04:06

## 2018-12-08 RX ADMIN — NICOTINE 14 MG: 14 PATCH, EXTENDED RELEASE TRANSDERMAL at 06:28

## 2018-12-08 RX ADMIN — RISPERIDONE 1 MG: 1 TABLET ORAL at 23:08

## 2018-12-08 RX ADMIN — SODIUM CHLORIDE: 9 INJECTION, SOLUTION INTRAVENOUS at 01:20

## 2018-12-08 RX ADMIN — RISPERIDONE 1 MG: 1 TABLET ORAL at 10:13

## 2018-12-08 RX ADMIN — AMPICILLIN SODIUM AND SULBACTAM SODIUM 3 G: 2; 1 INJECTION, POWDER, FOR SOLUTION INTRAMUSCULAR; INTRAVENOUS at 06:28

## 2018-12-08 RX ADMIN — HEPARIN SODIUM 5000 UNITS: 5000 INJECTION, SOLUTION INTRAVENOUS; SUBCUTANEOUS at 06:28

## 2018-12-08 RX ADMIN — HEPARIN SODIUM 5000 UNITS: 5000 INJECTION, SOLUTION INTRAVENOUS; SUBCUTANEOUS at 21:51

## 2018-12-08 RX ADMIN — LEVETIRACETAM 500 MG: 500 TABLET ORAL at 06:28

## 2018-12-08 RX ADMIN — HEPARIN SODIUM 5000 UNITS: 5000 INJECTION, SOLUTION INTRAVENOUS; SUBCUTANEOUS at 13:07

## 2018-12-08 RX ADMIN — OXYCODONE HYDROCHLORIDE 5 MG: 5 TABLET ORAL at 06:34

## 2018-12-08 RX ADMIN — OXYCODONE HYDROCHLORIDE 5 MG: 5 TABLET ORAL at 18:06

## 2018-12-08 RX ADMIN — LEVETIRACETAM 500 MG: 500 TABLET ORAL at 18:06

## 2018-12-08 RX ADMIN — OXYCODONE HYDROCHLORIDE 5 MG: 5 TABLET ORAL at 09:01

## 2018-12-08 RX ADMIN — AMPICILLIN SODIUM AND SULBACTAM SODIUM 3 G: 2; 1 INJECTION, POWDER, FOR SOLUTION INTRAMUSCULAR; INTRAVENOUS at 23:09

## 2018-12-08 RX ADMIN — VANCOMYCIN HYDROCHLORIDE 1300 MG: 100 INJECTION, POWDER, LYOPHILIZED, FOR SOLUTION INTRAVENOUS at 04:06

## 2018-12-08 ASSESSMENT — ENCOUNTER SYMPTOMS
WEAKNESS: 0
FEVER: 0
NAUSEA: 0
CHILLS: 0
VOMITING: 0
ABDOMINAL PAIN: 0

## 2018-12-08 ASSESSMENT — PAIN SCALES - GENERAL
PAINLEVEL_OUTOF10: 10
PAINLEVEL_OUTOF10: 7
PAINLEVEL_OUTOF10: 5
PAINLEVEL_OUTOF10: 5

## 2018-12-08 NOTE — PROGRESS NOTES
0710: Bedside report received.  Pt resting in bed.  Denies any needs or pain at this time.  Call light and personal belongings within reach.  Bed locked in lowest position.  Bed alarm on.  Pt educated to call for assistance as needed.    0900: Discussed with Dr Low--OK to medicate early with PRN oxy for first dressing change.  Wound care RN at bedside for dressing change.

## 2018-12-08 NOTE — PROGRESS NOTES
33yoM with left thumb infection s/p excisional debridement in OR 12/6.  Admitted to hospitalist service    S: No complaints this am    O:    Vitals:    12/08/18 0900   BP: 118/54   Pulse: 62   Resp: 16   Temp: 36.2 °C (97.2 °F)   SpO2: 99%     Exam:  General-NAD, alert and following commands  LUE-thumb dressing c/d/i, minimal thumb flexion/extension in dressing    A: 33yoM with left thumb infection s/p excisional debridement in OR 12/6.  Admitted to hospitalist service.  Wound picture reviewed from yesterday.    Recs:  --continue wound care  --continue IV abx   --fu 2 weeks for wound check  --no plan for further surgery unless worsens clinically

## 2018-12-08 NOTE — WOUND TEAM
"[facility]  Inpatient Services  Initial Wound and Skin Care Evaluation    Admission Date: 12/6/2018     Consult Date: 12/8/2018 @  0929  HPI, PMH, SH: Reviewed    Unit where seen by Wound Team: [id number]     WOUND CONSULT RELATED TO:  Left Thumb      SUBJECTIVE:  \"I have help at home\"      Self Report / Pain Level:  Pt c/o pain when dressing removed. Pt medicated w/ oxycodone by bedside RN    OBJECTIVE:  Left thumb dressing in place. No drainage noted.     WOUND TYPE, LOCATION, CHARACTERISTICS (Pressure Injuries: location, stage, POA or date identified)                   Wound 12/08/18 Open Surgical Partial Thickness Wound Hand Left Thumb (Active)   Wound Image      Site Assessment Red;Pink;Clean    Claire-wound Assessment Pink    Margins Unattached edges;Attached edges;Defined edges    Wound Length (cm) 10 cm    Wound Width (cm) 7 cm    Wound Depth (cm) 0.1 cm    Wound Surface Area (cm^2) 70 cm^2    Tunneling 0 cm    Undermining 0 cm    Closure Secondary intention    Drainage Amount Scant    Drainage Description Serosanguineous    Non-staged Wound Description Partial thickness    Treatments Cleansed;Site care    Cleansing Normal Saline Irrigation    Periwound Protectant Not Applicable    Dressing Options Nonadherent Contact Layer;Nonadhesive Foam;Hypafix Tape    Dressing Cleansing/Solutions Not Applicable    Dressing Changed New    Dressing Status Clean;Dry;Intact    Dressing Change Frequency Daily    NEXT Dressing Change  12/09/18    NEXT Weekly Photo (Inpatient Only) 12/15/18    WOUND NURSE ONLY - Odor None    WOUND NURSE ONLY - Exposed Structures None    WOUND NURSE ONLY - Tissue Type and Percentage 100% pink/red                Lab Values:    WBC:       WBC   Date/Time Value Ref Range Status   12/07/2018 04:30 AM 7.3 4.8 - 10.8 K/uL Final     AIC:    No results found for: HBA1C      Culture:   Deferred. Cultures already completed in the OR 12/6/2018. Positive for S. Aureus and group A strep "     INTERVENTIONS BY WOUND TEAM:  Patient seen. Dressing carefully removed. Cleansed wound w/ NS and gauze, patted dry. Measurement and picture done. Adaptic cut and placed on top of wound. Scant serosanguinous drainage noted. Non adhesive foam placed on top. Secured w/ roll gauze and hypafix tape.     Dressing selection:  Adaptic, non adhesive foam, roll gauze, hypafix tape         Interdisciplinary consultation: Patient, Bedside RN     EVALUATION: Patient admitted for cellulitis to left hand. Pt POD 2 I&D of left thumb. Cultures positive for S. Aureus and group A strep, Pt currently on IV abx. At the time of assessment, left thumb wound appears to be clean pink/red tissue. Adaptic placed as a non traumatic dressing to protect tissue. Bedside nursing will be doing daily dressing changes. Pt reports he has help at home to assist w/ daily dressing changes.    Factors affecting wound healing: infection, IV drug use, tobacco abuse  Goals: Steady decrease in wound area and depth weekly.    NURSING PLAN OF CARE ORDERS (X):    Dressing changes: See Dressing Care orders: X  Skin care: See Skin Care orders: X  Rectal tube care: See Rectal Tube Care orders:   Other orders:    RSKIN: CURRENT (X) ORDERED (O):   Q shift Silver:  X  Q shift pressure point assessments:  X  Pressure redistribution mattress          X  KAREN          Bariatric KAREN         Bariatric foam           Heel float boots          Float Heels off Bed with Pillows               Barrier wipes         Barrier Cream         Barrier paste          Sacral silicone dressing         Silicone O2 tubing         Anchorfast         Cannula fixation Device (Tender )          Gray Foam Ear protectors           Trach with Optifoam split foam                 Waffle cushion    O    Waffle Overlay      O   Rectal tube or BMS         Antifungal tx      Interdry          Turn q 2 hours    X pt able to turn self    Up to chair      X  Ambulate      PT/OT        Dietician         Diabetes Education      PO  X   TF     TPN     NPO   # days   Other        WOUND TEAM PLAN OF CARE (X):   NPWT change 3 x week:        Dressing changes by wound team:       Follow up as needed:    X   Other (explain):     Anticipated discharge plans (X):   SNF:           Home Care:           Outpatient Wound Center:            Self Care:            Other:         TBD

## 2018-12-08 NOTE — CARE PLAN
Problem: Communication  Goal: The ability to communicate needs accurately and effectively will improve    Intervention: [place] patient and significant other/support system to call light to alert staff of needs  Patient educated to use call light to alert staff to any needs. Patient is more awake this shift. Patient was alert and talking to this RN about his daughter/family and asking questions about his thumb. POC discussed and questions answered.      Problem: Venous Thromboembolism (VTW)/Deep Vein Thrombosis (DVT) Prevention:  Goal: Patient will participate in Venous Thrombosis (VTE)/Deep Vein Thrombosis (DVT)Prevention Measures    Intervention: Ensure patient wears graduated elastic stockings (LYN hose) and/or SCDs, if ordered, when in bed or chair (Remove at least once per shift for skin check)  SCDs in use bilaterally when patient is not ambulating.

## 2018-12-08 NOTE — PROGRESS NOTES
[facility] Hospitalist Progress Note    Date of Service: 2018    Chief Complaint  33 y.o. male admitted 2018 with cellulitis and abrasion of the left hand    Interval Problem Update  Cellulitis--DC vancomycin; cultures showing MSSA and group A strep; ID following  Abrasion/Wound --- wound c/s; will need OP wound care; possibly better through HH as he can't no-show as easily    Consultants/Specialty  Infectious disease    Disposition  F2F and HH set up      Review of Systems   Unable to perform ROS: Mental acuity   Constitutional: Negative for chills, fever and malaise/fatigue.   Gastrointestinal: Negative for abdominal pain, nausea and vomiting.   Neurological: Negative for weakness.   All other systems reviewed and are negative.     Physical Exam  Laboratory/Imaging   Hemodynamics  Temp (24hrs), Av.4 °C (97.6 °F), Min:36.3 °C (97.3 °F), Max:36.6 °C (97.9 °F)   Temperature: 36.3 °C (97.3 °F)  Pulse  Av.1  Min: 56  Max: 118    Blood Pressure: 133/78      Respiratory      Respiration: 16, Pulse Oximetry: 95 %             Fluids    Intake/Output Summary (Last 24 hours) at 18 0917  Last data filed at 18 0600   Gross per 24 hour   Intake              620 ml   Output             1400 ml   Net             -780 ml       Nutrition  Orders Placed This Encounter   Procedures   • Diet Order Regular     Standing Status:   Standing     Number of Occurrences:   1     Order Specific Question:   Diet:     Answer:   Regular [1]     Physical Exam   Constitutional: He is oriented to person, place, and time. He appears well-developed and well-nourished. No distress.   HENT:   Head: Normocephalic and atraumatic.   Eyes: Pupils are equal, round, and reactive to light. EOM are normal.   Neck: Neck supple.   Cardiovascular: Normal rate, regular rhythm and normal heart sounds.    Pulmonary/Chest: Effort normal and breath sounds normal. No respiratory distress. He has no wheezes. He has no rales.   Abdominal: Soft.  He exhibits no distension. There is no tenderness.   Neurological: He is alert and oriented to person, place, and time. No cranial nerve deficit.   Skin: Skin is warm and dry.   Dressing to left hand CDI       Recent Labs      12/06/18   0235  12/07/18 0430   WBC  14.2*  7.3   RBC  5.15  4.38*   HEMOGLOBIN  16.0  13.2*   HEMATOCRIT  45.0  39.3*   MCV  87.4  89.7   MCH  31.1  30.1   MCHC  35.6*  33.6*   RDW  39.2  41.6   PLATELETCT  398  362   MPV  8.9*  8.7*     Recent Labs      12/06/18 0235  12/07/18 0430   SODIUM  134*  137   POTASSIUM  3.3*  4.0   CHLORIDE  98  107   CO2  26  24   GLUCOSE  98  108*   BUN  9  9   CREATININE  1.12  0.87   CALCIUM  9.1  7.8*     Recent Labs      12/06/18 0235   APTT  36.0   INR  1.11                  Assessment/Plan     * Infected abrasion of hand, left, initial encounter   Assessment & Plan    No clear antecedent event  Infectious disease following--appreciate the assistance  History of MRSA       Polysubstance abuse (HCC)   Assessment & Plan    Reports using methamphetamine 2-3 days ago  Reports using MDMA in the last several days  History of chronic pain with multiple procedures in the past     Noncompliance- (present on admission)   Assessment & Plan    Patient has missed numerous appointments with outpatient PCP and pain management provider  He has been unwilling or unable to stop using multiple drugs of abuse  He continues to smoke tobacco       Hx of seizure disorder- (present on admission)   Assessment & Plan    Review of the records back to 2014 shows the patient has not been seen for seizure   He reports a seizure withdrawing from methamphetamine  No clear history of seizure disorder  We will resume his outpatient Keppra, however, given his polysubstance abuse and lower seizure threshold     Bipolar disorder, mixed (HCC)- (present on admission)   Assessment & Plan    Was taking risperidone and BuSpar as an outpatient with erratic filling pattern.  Reconciled with  outpatient pharmacy     Tobacco abuse   Assessment & Plan    He refuses to quit at this time       Quality-Core Measures   Reviewed items::  Labs reviewed and Medications reviewed  Kennedy catheter::  No Kennedy  DVT prophylaxis pharmacological::  Heparin

## 2018-12-08 NOTE — FACE TO FACE
Face to Face Supporting Documentation - Home Health    The encounter with this patient was in whole or in part the primary reason for home health admission.    Date of encounter:   Patient:                    MRN:                       YOB: 2020  Hamlet Islas  [id number]  1985     Home health to see patient for:  Wound Care    Skilled need for:  Surgical Aftercare Wound and New Onset Medical Diagnosis Wound and Cellulitis    Skilled nursing interventions to include:  Wound Care    Homebound status evidenced by:  Require the use of special transportation. Leaving home requires a considerable and taxing effort. There is a normal inability to leave the home.    Community Physician to provide follow up care: BASHIR Cartagena.     Optional Interventions? No      I certify the face to face encounter for this home health care referral meets the CMS requirements and the encounter/clinical assessment with the patient was, in whole, or in part, for the medical condition(s) listed above, which is the primary reason for home health care. Based on my clinical findings: the service(s) are medically necessary, support the need for home health care, and the homebound criteria are met.  I certify that this patient has had a face to face encounter by myself.  BASHIR Anaya. - NPI: [id number]

## 2018-12-09 ENCOUNTER — HOME HEALTH ADMISSION (OUTPATIENT)
Dept: HOME HEALTH SERVICES | Facility: HOME HEALTHCARE | Age: 33
End: 2018-12-09
Payer: MEDICARE

## 2018-12-09 LAB
BACTERIA SPEC ANAEROBE CULT: NORMAL
SIGNIFICANT IND 70042: NORMAL
SITE SITE: NORMAL
SOURCE SOURCE: NORMAL

## 2018-12-09 PROCEDURE — 770006 HCHG ROOM/CARE - MED/SURG/GYN SEMI*

## 2018-12-09 PROCEDURE — 700111 HCHG RX REV CODE 636 W/ 250 OVERRIDE (IP): Performed by: HOSPITALIST

## 2018-12-09 PROCEDURE — 99239 HOSP IP/OBS DSCHRG MGMT >30: CPT | Performed by: INTERNAL MEDICINE

## 2018-12-09 PROCEDURE — 700105 HCHG RX REV CODE 258: Performed by: HOSPITALIST

## 2018-12-09 PROCEDURE — 700102 HCHG RX REV CODE 250 W/ 637 OVERRIDE(OP): Performed by: HOSPITALIST

## 2018-12-09 PROCEDURE — 99232 SBSQ HOSP IP/OBS MODERATE 35: CPT | Performed by: INTERNAL MEDICINE

## 2018-12-09 PROCEDURE — A9270 NON-COVERED ITEM OR SERVICE: HCPCS | Performed by: HOSPITALIST

## 2018-12-09 RX ORDER — SODIUM CHLORIDE 9 MG/ML
INJECTION, SOLUTION INTRAVENOUS
Status: ACTIVE
Start: 2018-12-09 | End: 2018-12-10

## 2018-12-09 RX ADMIN — AMPICILLIN SODIUM AND SULBACTAM SODIUM 3 G: 2; 1 INJECTION, POWDER, FOR SOLUTION INTRAMUSCULAR; INTRAVENOUS at 11:44

## 2018-12-09 RX ADMIN — LEVETIRACETAM 500 MG: 500 TABLET ORAL at 05:44

## 2018-12-09 RX ADMIN — OXYCODONE HYDROCHLORIDE 5 MG: 5 TABLET ORAL at 00:05

## 2018-12-09 RX ADMIN — SODIUM CHLORIDE: 9 INJECTION, SOLUTION INTRAVENOUS at 16:38

## 2018-12-09 RX ADMIN — HEPARIN SODIUM 5000 UNITS: 5000 INJECTION, SOLUTION INTRAVENOUS; SUBCUTANEOUS at 14:07

## 2018-12-09 RX ADMIN — STANDARDIZED SENNA CONCENTRATE AND DOCUSATE SODIUM 2 TABLET: 8.6; 5 TABLET, FILM COATED ORAL at 16:38

## 2018-12-09 RX ADMIN — HEPARIN SODIUM 5000 UNITS: 5000 INJECTION, SOLUTION INTRAVENOUS; SUBCUTANEOUS at 05:44

## 2018-12-09 RX ADMIN — HEPARIN SODIUM 5000 UNITS: 5000 INJECTION, SOLUTION INTRAVENOUS; SUBCUTANEOUS at 22:22

## 2018-12-09 RX ADMIN — SODIUM CHLORIDE: 9 INJECTION, SOLUTION INTRAVENOUS at 05:43

## 2018-12-09 RX ADMIN — LEVETIRACETAM 500 MG: 500 TABLET ORAL at 16:38

## 2018-12-09 RX ADMIN — AMPICILLIN SODIUM AND SULBACTAM SODIUM 3 G: 2; 1 INJECTION, POWDER, FOR SOLUTION INTRAMUSCULAR; INTRAVENOUS at 17:26

## 2018-12-09 RX ADMIN — AMPICILLIN SODIUM AND SULBACTAM SODIUM 3 G: 2; 1 INJECTION, POWDER, FOR SOLUTION INTRAMUSCULAR; INTRAVENOUS at 05:44

## 2018-12-09 RX ADMIN — OXYCODONE HYDROCHLORIDE 5 MG: 5 TABLET ORAL at 22:22

## 2018-12-09 RX ADMIN — OXYCODONE HYDROCHLORIDE 2.5 MG: 5 TABLET ORAL at 11:59

## 2018-12-09 RX ADMIN — NICOTINE 14 MG: 14 PATCH, EXTENDED RELEASE TRANSDERMAL at 05:44

## 2018-12-09 RX ADMIN — AMPICILLIN SODIUM AND SULBACTAM SODIUM 3 G: 2; 1 INJECTION, POWDER, FOR SOLUTION INTRAMUSCULAR; INTRAVENOUS at 23:47

## 2018-12-09 RX ADMIN — OXYCODONE HYDROCHLORIDE 5 MG: 5 TABLET ORAL at 15:14

## 2018-12-09 ASSESSMENT — PAIN SCALES - GENERAL
PAINLEVEL_OUTOF10: 9
PAINLEVEL_OUTOF10: 5
PAINLEVEL_OUTOF10: 3
PAINLEVEL_OUTOF10: 8
PAINLEVEL_OUTOF10: 5
PAINLEVEL_OUTOF10: 8

## 2018-12-09 ASSESSMENT — ENCOUNTER SYMPTOMS
FEVER: 0
WEAKNESS: 0
VOMITING: 0
ABDOMINAL PAIN: 0
CHILLS: 0
NAUSEA: 0

## 2018-12-09 NOTE — DISCHARGE PLANNING
Thank you for the referral. An RN supervisor is currently reviewing the referral. We will update you as soon as that is complete. Thank you for your understanding.

## 2018-12-09 NOTE — DISCHARGE PLANNING
Received Choice form at [id number]  Agency/Facility Name: [facility]  Referral sent per Choice form @ 0586

## 2018-12-09 NOTE — CARE PLAN
Problem: Safety  Goal: Will remain free from falls    Intervention: Implement fall precautions  Call light within reach, bed alarm in place, and staff are present when patient gets out of bed to ambulate.       Problem: Fluid Volume:  Goal: Will maintain balanced intake and output    Intervention: Monitor, educate, and encourage compliance with therapeutic intake of liquids  Patient is receiving IV fluids, and is drinking fluids orally through the shift.

## 2018-12-09 NOTE — DISCHARGE PLANNING
Anticipated Discharge Disposition: Home with home health.    Action: This RN CM spoke with the patient about home health.  He is in agreement with it and chose [facility] first and [facility] second.  Choice form signed and faxed to [facility].    Barriers to Discharge: Acceptance by home health.    Plan: Home with home health once a home health agency accepts him.

## 2018-12-09 NOTE — DISCHARGE PLANNING
Agency/Facility Name: [facility]  Spoke To: Eloise  Outcome:  They have declined patient as no current  Resident.  They will not see patients at the shelter.

## 2018-12-09 NOTE — DISCHARGE PLANNING
ATTN: Case Management  RE: Referral for Home Health    Reason for referral denial: Patient does not meet criteria for home health per RN supervisor review.                We would like to take this opportunity to thank you for submitting a referral for your patient to continue the journey to recovery with [facility]. We hope to facilitate continued referrals from your facility as our goal is to provide quality, skilled care to as many patients as possible.            Unfortunately, we are not able to accept your patient into our service for the reason listed above. If further clarity is needed, our Intake Coordinators are available to discuss any barriers to service.            If you have any questions or concerns regarding this or future patients’ transition to Home Health, please do not hesitate to contact us. We are open for referrals 7 days a week from 8AM to 5PM at [phone].      We look forward to collaborating with you in the future,  [facility] Team

## 2018-12-09 NOTE — DISCHARGE SUMMARY
Discharge Summary    CHIEF COMPLAINT ON ADMISSION  Chief Complaint   Patient presents with   • Digit Pain     left thumb swelling        Reason for Admission  L thumb pain     Admission Date  12/6/2018    CODE STATUS  Full Code    HPI & HOSPITAL COURSE  This is a 33 y.o. male here with left hand cellulitis with abscess formation, polysubstance abuse including methamphetamine and MDMA, tobacco abuse, bipolar disorder, and chronic noncompliance.  Please see the dictated history of present illness 12/6/2018 for complete details.  In short, the patient presented with cellulitis of the left hand and thumb with evidence of subcutaneous fluid on CT consistent with abscess.  His presentation was consistent with Sirs but not sepsis at that time with leukocytosis and tachycardia but afebrile with otherwise normal vital signs.  Patient was started on broad-spectrum antibiotics including vancomycin and Unasyn.  Blood cultures were obtained and orthopedics consult was obtained for anticipated incision and debridement surgery.     The patient underwent irrigation and debridement 12/6/2018.  Cultures ultimately produced MSSA and group a strep beta-hemolytic.  Infectious disease was consulted.  The patient remained on IV antibiotics while an inpatient and was evaluated by wound care for dressing changes.  Drug urine screen showed methamphetamine use.  The patient was maintained on his outpatient antiseizure medication and low-dose risperidone.  However he exhibited persistent lethargy and was withdrawn.  Ultimately the risperidone was discontinued as he failed to participate with healthcare providers.  Reconciliation with his pharmacy shows that he has sporadic refills of his outpatient medications, which include buspirone and risperidone and Keppra for reported drug withdrawal seizure.  He does follow with a chronic pain provider but has frequent no shows with him as well as with his outpatient primary care provider.    Given his  long-standing polysubstance abuse, tobacco abuse and noncompliance he will likely benefit from wound care in home versus needing to follow-up with an outpatient wound clinic.  Home health has been ordered for wound care and when this is arranged he can be discharged on a prolonged course of clindamycin.  Anticipated treatment duration is 4 weeks. He has been accepted by [facility].     Therefore, he is discharged in good and stable condition to home with organized home healthcare and close outpatient follow-up.    Discharge Date  12/10/2018    FOLLOW UP ITEMS POST DISCHARGE  See below    DISCHARGE DIAGNOSES  Principal Problem:    Infected abrasion of hand, left, initial encounter POA: Unknown  Active Problems:    Tobacco abuse POA: Yes    Bipolar disorder, mixed (HCC) POA: Yes    Hx of seizure disorder POA: Yes    Noncompliance POA: Yes    Polysubstance abuse (HCC) POA: Yes  Resolved Problems:    SIRS (systemic inflammatory response syndrome) ([facility]) POA: Unknown      FOLLOW UP  Future Appointments  Date Time Provider Department Center   1/2/2019 2:40 PM NANCY Pitt [facility] None     Rusty Bianchi M.D.  [address]  [address] [address]  [phone]    Schedule an appointment as soon as possible for a visit in 10 days  evaluation and staple removal     [address] [address]  [phone]        Rusty Bianchi M.D.  [address]  [facility] [address]  [facility] [address]  [phone]    Schedule an appointment as soon as possible for a visit  Needs to be seen 2 weeks following discharge      MEDICATIONS ON DISCHARGE     Medication List      START taking these medications      Instructions   clindamycin 300 MG Caps  Commonly known as:  CLEOCIN   Take 1 Cap by mouth 3 times a day for 28 days.  Dose:  300 mg        CONTINUE taking these medications      Instructions   busPIRone 7.5 MG tablet  Commonly known as:   BUSPAR   Take 7.5 mg by mouth 2 times a day.  Dose:  7.5 mg     levETIRAcetam 500 MG Tabs  Commonly known as:  KEPPRA   Take 1 Tab by mouth 2 times a day.  Dose:  500 mg     naproxen 500 MG Tabs  Commonly known as:  NAPROSYN   Doctor's comments:  Use sparingly.  TAKE 1 TABLET BY MOUTH TWICE DAILY AS NEEDED            Allergies  No Known Allergies    DIET  Orders Placed This Encounter   Procedures   • Diet Order Regular     Standing Status:   Standing     Number of Occurrences:   1     Order Specific Question:   Diet:     Answer:   Regular [1]       ACTIVITY  As tolerated.  Weight bearing as tolerated    CONSULTATIONS  ID  [facility]    PROCEDURES  I&D left hand    LABORATORY  Lab Results   Component Value Date    SODIUM 137 12/07/2018    POTASSIUM 4.0 12/07/2018    CHLORIDE 107 12/07/2018    CO2 24 12/07/2018    GLUCOSE 108 (H) 12/07/2018    BUN 9 12/07/2018    CREATININE 0.87 12/07/2018        Lab Results   Component Value Date    WBC 7.3 12/07/2018    HEMOGLOBIN 13.2 (L) 12/07/2018    HEMATOCRIT 39.3 (L) 12/07/2018    PLATELETCT 362 12/07/2018        Total time of the discharge process exceeds 36 minutes.

## 2018-12-09 NOTE — PROGRESS NOTES
[facility] Hospitalist Progress Note    Date of Service: 2018    Chief Complaint  33 y.o. male admitted 2018 with cellulitis and abrasion of the left hand    Interval Problem Update  Cellulitis--DC unasyn; PO clinda. Cultures showing MSSA and group A strep; ID following. 4 weeks of therapy.  Abrasion/Wound --- wound c/s; will need OP wound care; possibly better through HH as he can't no-show as easily  Bipolar --- too sedated on Risperdal. DC. On keppra. Consider low dose seroquel.  Non-compliant --- discussed w pt    Consultants/Specialty  Infectious disease    Disposition  F2F and HH set up      Review of Systems   Unable to perform ROS: Mental acuity   Constitutional: Negative for chills, fever and malaise/fatigue.   Gastrointestinal: Negative for abdominal pain, nausea and vomiting.   Neurological: Negative for weakness.   All other systems reviewed and are negative.     Physical Exam  Laboratory/Imaging   Hemodynamics  Temp (24hrs), Av.2 °C (99 °F), Min:36.8 °C (98.3 °F), Max:37.7 °C (99.9 °F)   Temperature: 37.7 °C (99.9 °F)  Pulse  Av  Min: 54  Max: 118    Blood Pressure: 140/73      Respiratory      Respiration: 17, Pulse Oximetry: 99 %        RUL Breath Sounds: Diminished, RML Breath Sounds: Diminished, RLL Breath Sounds: Clear, SANDHYA Breath Sounds: Diminished, LLL Breath Sounds: Clear    Fluids    Intake/Output Summary (Last 24 hours) at 18 1317  Last data filed at 18 1200   Gross per 24 hour   Intake             6654 ml   Output             1051 ml   Net             5603 ml       Nutrition  Orders Placed This Encounter   Procedures   • Diet Order Regular     Standing Status:   Standing     Number of Occurrences:   1     Order Specific Question:   Diet:     Answer:   Regular [1]     Physical Exam   Constitutional: He is oriented to person, place, and time. He appears well-developed and well-nourished. No distress.   HENT:   Head: Normocephalic and atraumatic.   Eyes: Pupils are  equal, round, and reactive to light. EOM are normal.   Neck: Neck supple.   Cardiovascular: Normal rate, regular rhythm and normal heart sounds.    Pulmonary/Chest: Effort normal and breath sounds normal. No respiratory distress. He has no wheezes. He has no rales.   Abdominal: Soft. He exhibits no distension. There is no tenderness.   Neurological: He is alert and oriented to person, place, and time. No cranial nerve deficit.   Skin: Skin is warm and dry.   Dressing to left hand CDI       Recent Labs      12/07/18   0430   WBC  7.3   RBC  4.38*   HEMOGLOBIN  13.2*   HEMATOCRIT  39.3*   MCV  89.7   MCH  30.1   MCHC  33.6*   RDW  41.6   PLATELETCT  362   MPV  8.7*     Recent Labs      12/07/18   0430   SODIUM  137   POTASSIUM  4.0   CHLORIDE  107   CO2  24   GLUCOSE  108*   BUN  9   CREATININE  0.87   CALCIUM  7.8*                      Assessment/Plan     * Infected abrasion of hand, left, initial encounter   Assessment & Plan    No clear antecedent event  Infectious disease following--appreciate the assistance  History of MRSA  IV abd while IP  OP Clinda per ID  OP wound care via HH. Can dc when arranged       Polysubstance abuse (HCC)- (present on admission)   Assessment & Plan    Reports using methamphetamine 2-3 days ago  Reports using MDMA in the last several days  History of chronic pain with multiple procedures in the past     Noncompliance- (present on admission)   Assessment & Plan    Patient has missed numerous appointments with outpatient PCP and pain management provider  He has been unwilling or unable to stop using multiple drugs of abuse  He continues to smoke tobacco       Hx of seizure disorder- (present on admission)   Assessment & Plan    Review of the records back to 2014 shows the patient has not been seen for seizure   He reports a seizure withdrawing from methamphetamine  No clear history of seizure disorder  We will resume his outpatient Keppra, however, given his polysubstance abuse and lower  seizure threshold     Bipolar disorder, mixed (HCC)- (present on admission)   Assessment & Plan    Was taking risperidone and BuSpar as an outpatient with erratic filling pattern.  Reconciled with outpatient pharmacy  Restarted risperidone, but patient extremely lethargic. DC  Possible seroquel     Tobacco abuse- (present on admission)   Assessment & Plan    He refuses to quit at this time       Quality-Core Measures   Reviewed items::  Labs reviewed and Medications reviewed  Kennedy catheter::  No Kennedy  DVT prophylaxis pharmacological::  Heparin

## 2018-12-10 VITALS
OXYGEN SATURATION: 96 % | DIASTOLIC BLOOD PRESSURE: 76 MMHG | HEART RATE: 60 BPM | HEIGHT: 70 IN | TEMPERATURE: 98.3 F | SYSTOLIC BLOOD PRESSURE: 150 MMHG | BODY MASS INDEX: 25.31 KG/M2 | RESPIRATION RATE: 18 BRPM | WEIGHT: 176.81 LBS

## 2018-12-10 PROCEDURE — 700111 HCHG RX REV CODE 636 W/ 250 OVERRIDE (IP): Performed by: HOSPITALIST

## 2018-12-10 PROCEDURE — 700105 HCHG RX REV CODE 258: Performed by: HOSPITALIST

## 2018-12-10 PROCEDURE — 700102 HCHG RX REV CODE 250 W/ 637 OVERRIDE(OP): Performed by: HOSPITALIST

## 2018-12-10 PROCEDURE — A9270 NON-COVERED ITEM OR SERVICE: HCPCS | Performed by: HOSPITALIST

## 2018-12-10 RX ORDER — LEVETIRACETAM 500 MG/1
500 TABLET ORAL 2 TIMES DAILY
Qty: 60 TAB | Refills: 1 | Status: SHIPPED | OUTPATIENT
Start: 2018-12-10

## 2018-12-10 RX ORDER — CLINDAMYCIN HYDROCHLORIDE 300 MG/1
300 CAPSULE ORAL 3 TIMES DAILY
Qty: 84 CAP | Refills: 0 | Status: SHIPPED | OUTPATIENT
Start: 2018-12-10 | End: 2019-01-07

## 2018-12-10 RX ADMIN — NICOTINE 14 MG: 14 PATCH, EXTENDED RELEASE TRANSDERMAL at 04:56

## 2018-12-10 RX ADMIN — AMPICILLIN SODIUM AND SULBACTAM SODIUM 3 G: 2; 1 INJECTION, POWDER, FOR SOLUTION INTRAMUSCULAR; INTRAVENOUS at 04:55

## 2018-12-10 RX ADMIN — OXYCODONE HYDROCHLORIDE 5 MG: 5 TABLET ORAL at 10:23

## 2018-12-10 RX ADMIN — LEVETIRACETAM 500 MG: 500 TABLET ORAL at 04:56

## 2018-12-10 RX ADMIN — STANDARDIZED SENNA CONCENTRATE AND DOCUSATE SODIUM 2 TABLET: 8.6; 5 TABLET, FILM COATED ORAL at 04:55

## 2018-12-10 RX ADMIN — OXYCODONE HYDROCHLORIDE 5 MG: 5 TABLET ORAL at 05:03

## 2018-12-10 RX ADMIN — HEPARIN SODIUM 5000 UNITS: 5000 INJECTION, SOLUTION INTRAVENOUS; SUBCUTANEOUS at 04:56

## 2018-12-10 ASSESSMENT — PAIN SCALES - GENERAL: PAINLEVEL_OUTOF10: 8

## 2018-12-10 NOTE — CARE PLAN
Problem: Infection  Goal: Will remain free from infection    Intervention: Implement standard precautions and perform hand washing before and after patient contact  Hand hygiene before and after patient contact. Standard precautions in place.       Problem: Respiratory:  Goal: Respiratory status will improve  Outcome: MET Date Met: 12/10/18  Patient saturates above 90% on room air. No respiratory distress.

## 2018-12-10 NOTE — DISCHARGE INSTRUCTIONS
Discharge Instructions    Discharged to home by car with self. Discharged via wheelchair, hospital escort: Yes.  Special equipment needed: Not Applicable    Be sure to schedule a follow-up appointment with your primary care doctor or any specialists as instructed.     Discharge Plan:   Influenza Vaccine Indication: Not indicated: Previously immunized this influenza season and > 8 years of age    I understand that a diet low in cholesterol, fat, and sodium is recommended for good health. Unless I have been given specific instructions below for another diet, I accept this instruction as my diet prescription.   Other diet: Regular    Special Instructions: None    · Is patient discharged on Warfarin / Coumadin?   No         Cellulitis, Adult  Introduction  Cellulitis is a skin infection. The infected area is usually red and sore. This condition occurs most often in the arms and lower legs. It is very important to get treated for this condition.  Follow these instructions at home:  · Take over-the-counter and prescription medicines only as told by your doctor.  · If you were prescribed an antibiotic medicine, take it as told by your doctor. Do not stop taking the antibiotic even if you start to feel better.  · Drink enough fluid to keep your pee (urine) clear or pale yellow.  · Do not touch or rub the infected area.  · Raise (elevate) the infected area above the level of your heart while you are sitting or lying down.  · Place warm or cold wet cloths (warm or cold compresses) on the infected area. Do this as told by your doctor.  · Keep all follow-up visits as told by your doctor. This is important. These visits let your doctor make sure your infection is not getting worse.  Contact a doctor if:  · You have a fever.  · Your symptoms do not get better after 1-2 days of treatment.  · Your bone or joint under the infected area starts to hurt after the skin has healed.  · Your infection comes back. This can happen in the  same area or another area.  · You have a swollen bump in the infected area.  · You have new symptoms.  · You feel ill and also have muscle aches and pains.  Get help right away if:  · Your symptoms get worse.  · You feel very sleepy.  · You throw up (vomit) or have watery poop (diarrhea) for a long time.  · There are red streaks coming from the infected area.  · Your red area gets larger.  · Your red area turns darker.  This information is not intended to replace advice given to you by your health care provider. Make sure you discuss any questions you have with your health care provider.  Document Released: 06/05/2009 Document Revised: 05/25/2017 Document Reviewed: 10/26/2016  © 2017 Elsevier    Clindamycin capsules  What is this medicine?  CLINDAMYCIN (KLIN da RUTHY sin) is a lincosamide antibiotic. It is used to treat certain kinds of bacterial infections. It will not work for colds, flu, or other viral infections.  This medicine may be used for other purposes; ask your health care provider or pharmacist if you have questions.  COMMON BRAND NAME(S): Cleocin  What should I tell my health care provider before I take this medicine?  They need to know if you have any of these conditions:  -kidney disease  -liver disease  -stomach problems like colitis  -an unusual or allergic reaction to clindamycin, lincomycin, or other medicines, foods, dyes like tartrazine or preservatives  -pregnant or trying to get pregnant  -breast-feeding  How should I use this medicine?  Take this medicine by mouth with a full glass of water. Follow the directions on the prescription label. You can take this medicine with food or on an empty stomach. If the medicine upsets your stomach, take it with food. Take your medicine at regular intervals. Do not take your medicine more often than directed. Take all of your medicine as directed even if you think your are better. Do not skip doses or stop your medicine early.  Talk to your pediatrician  regarding the use of this medicine in children. Special care may be needed.  Overdosage: If you think you have taken too much of this medicine contact a poison control center or emergency room at once.  NOTE: This medicine is only for you. Do not share this medicine with others.  What if I miss a dose?  If you miss a dose, take it as soon as you can. If it is almost time for your next dose, take only that dose. Do not take double or extra doses.  What may interact with this medicine?  -birth control pills  -erythromycin  -medicines that relax muscles for surgery  -rifampin  This list may not describe all possible interactions. Give your health care provider a list of all the medicines, herbs, non-prescription drugs, or dietary supplements you use. Also tell them if you smoke, drink alcohol, or use illegal drugs. Some items may interact with your medicine.  What should I watch for while using this medicine?  Tell your doctor or healthcare professional if your symptoms do not start to get better or if they get worse.  Do not treat diarrhea with over the counter products. Contact your doctor if you have diarrhea that lasts more than 2 days or if it is severe and watery.  What side effects may I notice from receiving this medicine?  Side effects that you should report to your doctor or health care professional as soon as possible:  -allergic reactions like skin rash, itching or hives, swelling of the face, lips, or tongue  -dark urine  -pain on swallowing  -redness, blistering, peeling or loosening of the skin, including inside the mouth  -unusual bleeding or bruising  -unusually weak or tired  -yellowing of eyes or skin  Side effects that usually do not require medical attention (report to your doctor or health care professional if they continue or are bothersome):  -diarrhea  -itching in the rectal or genital area  -joint pain  -nausea, vomiting  -stomach pain  This list may not describe all possible side effects.  Call your doctor for medical advice about side effects. You may report side effects to FDA at 9-318-VLK-6790.  Where should I keep my medicine?  Keep out of the reach of children.  Store at room temperature between 20 and 25 degrees C (68 and 77 degrees F). Throw away any unused medicine after the expiration date.  NOTE: This sheet is a summary. It may not cover all possible information. If you have questions about this medicine, talk to your doctor, pharmacist, or health care provider.  © 2018 Elsevier/Gold Standard (2017-03-22 16:34:00)      Depression / Suicide Risk    As you are discharged from this [facility] facility, it is important to learn how to keep safe from harming yourself.    Recognize the warning signs:  · Abrupt changes in personality, positive or negative- including increase in energy   · Giving away possessions  · Change in eating patterns- significant weight changes-  positive or negative  · Change in sleeping patterns- unable to sleep or sleeping all the time   · Unwillingness or inability to communicate  · Depression  · Unusual sadness, discouragement and loneliness  · Talk of wanting to die  · Neglect of personal appearance   · Rebelliousness- reckless behavior  · Withdrawal from people/activities they love  · Confusion- inability to concentrate     If you or a loved one observes any of these behaviors or has concerns about self-harm, here's what you can do:  · Talk about it- your feelings and reasons for harming yourself  · Remove any means that you might use to hurt yourself (examples: pills, rope, extension cords, firearm)  · Get professional help from the community (Mental Health, Substance Abuse, psychological counseling)  · Do not be alone:Call your Safe Contact- someone whom you trust who will be there for you.  · Call your local CRISIS HOTLINE 095-1877 or 169-380-9350  · Call your local Children's Mobile Crisis Response Team [place] (770) 135-3654 or  www.[employer]  · Call the toll free National Suicide Prevention Hotlines   · National Suicide Prevention Lifeline 763-894-LESR (3703)  · National Hope Line Network 800-SUICIDE (086-2037)

## 2018-12-10 NOTE — PROGRESS NOTES
Discharge instructions reviewed with patient.  All questions answered.  PIV removed, L hand dressing change performed.  Reviewed PO antibiotic orders with patient/where to .  Patient declining wheelchair escort.  Discharged with taxi voucher and all belongings.

## 2018-12-10 NOTE — CARE PLAN
Problem: Venous Thromboembolism (VTW)/Deep Vein Thrombosis (DVT) Prevention:  Goal: Patient will participate in Venous Thrombosis (VTE)/Deep Vein Thrombosis (DVT)Prevention Measures    Intervention: Ensure patient wears graduated elastic stockings (LYN hose) and/or SCDs, if ordered, when in bed or chair (Remove at least once per shift for skin check)  SCDs in place; Patient refused to be ambulated in the hallway.       Problem: Pain Management  Goal: Pain level will decrease to patient's comfort goal    Intervention: Educate and implement non-pharmacologic comfort measures. Examples: relaxation, distration, play therapy, activity therapy, massage, etc.  Pain is best managed with Oxycodone 5mg, ice pack applied. Educ about other non-pharmacologic interventions.

## 2018-12-10 NOTE — DISCHARGE PLANNING
Agency/Facility Name: [facility]  Fax Notification   Outcome: Accepted per Karlie.

## 2018-12-10 NOTE — PROGRESS NOTES
"   Orthopaedic PA Progress Note    Interval changes:Resting comfortably, ID note read and appreciated. OK to go from Ortho standpoint if willing to follow outpatient advice and return for F/U Dr. Bianchi in 2 weeks    ROS - Patient denies any new issues. No chest pain, dyspnea, or fever.  Pain well controlled.    Blood pressure 143/81, pulse 61, temperature 37.6 °C (99.6 °F), temperature source Temporal, resp. rate 17, height 1.778 m (5' 10\"), weight 80.2 kg (176 lb 12.9 oz), SpO2 94 %.    Patient seen and examined  No acute distress  Breathing non labored  RRR  Surgical dressing is clean, dry, and intact. Patient clearly fires forearm flexors, forearm extensors, and moves all five fingers without issue . Sensation is intact to light touch throughout median, ulnar, and radial nerve distributions. Strong and palpable radial pulses with capillary refill less than 2 seconds. No arm or hand discomfort.    Recent Labs      12/07/18   0430   WBC  7.3   RBC  4.38*   HEMOGLOBIN  13.2*   HEMATOCRIT  39.3*   MCV  89.7   MCH  30.1   MCHC  33.6*   RDW  41.6   PLATELETCT  362   MPV  8.7*       Active Hospital Problems    Diagnosis   • Infected abrasion of hand, left, initial encounter [S60.512A, L08.9]     Priority: High   • Tobacco abuse [Z72.0]     Priority: Low   • Polysubstance abuse ([facility]) [F19.10]   • Noncompliance [Z91.19]   • Hx of seizure disorder [Z86.69]   • Bipolar disorder, mixed ([facility]) [F31.60]       Assessment/Plan:  POD#3 S/P I+D L thumb  Sutures/Staples out- 10-14 days post operatively  Antibiotics: per ID  Case Coordination for Discharge Planning - Disposition Follow-Up: needs appointment with Dr. Bianchi at [facility] at 10-14 days post-op for re-evaluation, staple removal and radiographs.        "

## 2018-12-11 ENCOUNTER — PATIENT OUTREACH (OUTPATIENT)
Dept: HEALTH INFORMATION MANAGEMENT | Facility: OTHER | Age: 33
End: 2018-12-11

## 2018-12-11 LAB
BACTERIA BLD CULT: NORMAL
BACTERIA BLD CULT: NORMAL
SIGNIFICANT IND 70042: NORMAL
SIGNIFICANT IND 70042: NORMAL
SITE SITE: NORMAL
SITE SITE: NORMAL
SOURCE SOURCE: NORMAL
SOURCE SOURCE: NORMAL

## 2018-12-11 NOTE — PROGRESS NOTES
12/11/18 9:10am: CM post discharge outreach call first attempt.  VM left on cell requesting return call to  at [phone].    12/11/18 3:45pm:  CM post discharge outreach call second attempt. VM left requesting return call to  at [phone].

## 2018-12-12 ENCOUNTER — TELEPHONE (OUTPATIENT)
Dept: INFECTIOUS DISEASES | Facility: MEDICAL CENTER | Age: 33
End: 2018-12-12

## 2018-12-13 ENCOUNTER — TELEPHONE (OUTPATIENT)
Dept: INFECTIOUS DISEASES | Facility: MEDICAL CENTER | Age: 33
End: 2018-12-13

## 2018-12-14 ENCOUNTER — OFFICE VISIT (OUTPATIENT)
Dept: URGENT CARE | Facility: CLINIC | Age: 33
End: 2018-12-14
Payer: MEDICARE

## 2018-12-14 ENCOUNTER — TELEPHONE (OUTPATIENT)
Dept: INFECTIOUS DISEASES | Facility: MEDICAL CENTER | Age: 33
End: 2018-12-14

## 2018-12-14 VITALS
RESPIRATION RATE: 16 BRPM | SYSTOLIC BLOOD PRESSURE: 102 MMHG | OXYGEN SATURATION: 95 % | BODY MASS INDEX: 25.31 KG/M2 | HEART RATE: 105 BPM | WEIGHT: 176.81 LBS | DIASTOLIC BLOOD PRESSURE: 70 MMHG | HEIGHT: 70 IN | TEMPERATURE: 98.5 F

## 2018-12-14 DIAGNOSIS — Z48.89 ENCOUNTER FOR POST SURGICAL WOUND CHECK: ICD-10-CM

## 2018-12-14 DIAGNOSIS — Z71.6 ENCOUNTER FOR SMOKING CESSATION COUNSELING: ICD-10-CM

## 2018-12-14 PROCEDURE — 99024 POSTOP FOLLOW-UP VISIT: CPT | Performed by: NURSE PRACTITIONER

## 2018-12-14 ASSESSMENT — ENCOUNTER SYMPTOMS
CONSTIPATION: 0
PALPITATIONS: 0
VOMITING: 0
WHEEZING: 0
NAUSEA: 0
FEVER: 0
CHILLS: 0
SHORTNESS OF BREATH: 0
ABDOMINAL PAIN: 0
DIARRHEA: 0

## 2018-12-15 NOTE — PROGRESS NOTES
Subjective:   Hamlet Islas is a 33 y.o. male who presents for Wound Check (left hand thum and wrist 12/6 had surgery and discharged on the 12/10.)        HPI   Patient presents with new onset left thumb wound check. He states that he had surgery on 12/6 and was discharged on 12/10 from ER. The wound care nurse has been seeing him at home regarding the surgical wound. He states today the dressing fell off and is asking to have it redressed in office today. Denies any current pain or drainage from the wound. Denies alleviating or aggravating factors.    Has been tolerating medications. Denies fever or chills.    States he has follow up appointment with Surgeon this upcoming Tuesday.    Review of Systems   Constitutional: Negative for chills, fever and malaise/fatigue.   Respiratory: Negative for shortness of breath and wheezing.    Cardiovascular: Negative for chest pain and palpitations.   Gastrointestinal: Negative for abdominal pain, constipation, diarrhea, nausea and vomiting.   Skin:        Left thumb wound       PMH:  has a past medical history of Asthma; Bipolar disorder ([facility]); Bipolar disorder with depression ([facility]) (9/8/2014); Chronic hip pain (9/8/2014); Chronic low back pain; Migraine; Neuropathy ([facility]); Obsessive compulsive disorder; Post traumatic stress disorder (PTSD); Psoriasis; Schizoaffective disorder ([facility]); Scoliosis (9/8/2014); and Tendinitis of foot.  MEDS:   Current Outpatient Prescriptions:   •  Cephalexin (KEFLEX PO), Take  by mouth., Disp: , Rfl:   •  levETIRAcetam (KEPPRA) 500 MG Tab, Take 1 Tab by mouth 2 times a day., Disp: 60 Tab, Rfl: 1  •  clindamycin (CLEOCIN) 300 MG Cap, Take 1 Cap by mouth 3 times a day for 28 days., Disp: 84 Cap, Rfl: 0  •  busPIRone (BUSPAR) 7.5 MG tablet, Take 7.5 mg by mouth 2 times a day., Disp: , Rfl:   •  naproxen (NAPROSYN) 500 MG Tab, TAKE 1 TABLET BY MOUTH TWICE DAILY AS NEEDED, Disp: 180 Tab, Rfl: 0  ALLERGIES: No Known Allergies  SURGHX:   Past  "Surgical History:   Procedure Laterality Date   • IRRIGATION & DEBRIDEMENT ORTHO Left 12/6/2018    Procedure: IRRIGATION & DEBRIDEMENT ORTHO;  Surgeon: Rusty Bianchi M.D.;  Location: SURGERY [facility];  Service: Orthopedics   • OTHER      oral     SOCHX:  reports that he has been smoking Cigarettes.  He has been smoking about 0.50 packs per day. He has never used smokeless tobacco. He reports that he drinks alcohol. He reports that he uses drugs.  FH: Family history was reviewed, no pertinent findings to report     Objective:   /70   Pulse (!) 105   Temp 36.9 °C (98.5 °F) (Temporal)   Resp 16   Ht 1.778 m (5' 10\")   Wt 80.2 kg (176 lb 12.9 oz)   SpO2 95%   BMI 25.37 kg/m²   Physical Exam   Constitutional: He appears well-developed and well-nourished. No distress.   Cardiovascular: Normal rate, regular rhythm and normal heart sounds.    Pulmonary/Chest: Effort normal and breath sounds normal.   Skin: He is not diaphoretic.   Entire left thumb with redness and wound healing - pink wound bed. No discharge noted.       Vitals reviewed.        Assessment/Plan:   Assessment    1. Encounter for post surgical wound check    2. Encounter for smoking cessation counseling    Other orders  - Cephalexin (KEFLEX PO); Take  by mouth.  Redressed wound in office with Xeroform gauze with gauze on top.    Continue with Keflex    Keep follow up appointment with Surgeon as scheduled.    Discussed starting Verve for smoking cessation; patient states he would like to discuss with PCP.    Differential diagnosis, natural history, supportive care, and indications for immediate follow-up discussed.       "

## 2018-12-18 ENCOUNTER — TELEPHONE (OUTPATIENT)
Dept: MEDICAL GROUP | Facility: MEDICAL CENTER | Age: 33
End: 2018-12-18

## 2018-12-20 ENCOUNTER — APPOINTMENT (OUTPATIENT)
Dept: RADIOLOGY | Facility: MEDICAL CENTER | Age: 33
End: 2018-12-20
Attending: EMERGENCY MEDICINE
Payer: MEDICARE

## 2018-12-20 ENCOUNTER — HOSPITAL ENCOUNTER (EMERGENCY)
Facility: MEDICAL CENTER | Age: 33
End: 2018-12-20
Attending: EMERGENCY MEDICINE
Payer: MEDICARE

## 2018-12-20 VITALS
TEMPERATURE: 98.6 F | RESPIRATION RATE: 17 BRPM | SYSTOLIC BLOOD PRESSURE: 113 MMHG | BODY MASS INDEX: 25.91 KG/M2 | WEIGHT: 181 LBS | OXYGEN SATURATION: 98 % | HEIGHT: 70 IN | HEART RATE: 84 BPM | DIASTOLIC BLOOD PRESSURE: 70 MMHG

## 2018-12-20 DIAGNOSIS — R11.0 NAUSEA: ICD-10-CM

## 2018-12-20 DIAGNOSIS — R53.83 FATIGUE, UNSPECIFIED TYPE: ICD-10-CM

## 2018-12-20 LAB
ALBUMIN SERPL BCP-MCNC: 3.7 G/DL (ref 3.2–4.9)
ALBUMIN/GLOB SERPL: 1.1 G/DL
ALP SERPL-CCNC: 64 U/L (ref 30–99)
ALT SERPL-CCNC: 34 U/L (ref 2–50)
AMPHET UR QL SCN: NEGATIVE
ANION GAP SERPL CALC-SCNC: 9 MMOL/L (ref 0–11.9)
APPEARANCE UR: CLEAR
AST SERPL-CCNC: 20 U/L (ref 12–45)
BARBITURATES UR QL SCN: NEGATIVE
BASOPHILS # BLD AUTO: 1.8 % (ref 0–1.8)
BASOPHILS # BLD: 0.13 K/UL (ref 0–0.12)
BENZODIAZ UR QL SCN: NEGATIVE
BILIRUB SERPL-MCNC: 0.6 MG/DL (ref 0.1–1.5)
BILIRUB UR QL STRIP.AUTO: NEGATIVE
BUN SERPL-MCNC: 21 MG/DL (ref 8–22)
BZE UR QL SCN: NEGATIVE
CALCIUM SERPL-MCNC: 8.9 MG/DL (ref 8.5–10.5)
CANNABINOIDS UR QL SCN: NEGATIVE
CHLORIDE SERPL-SCNC: 105 MMOL/L (ref 96–112)
CO2 SERPL-SCNC: 23 MMOL/L (ref 20–33)
COLOR UR: YELLOW
CREAT SERPL-MCNC: 0.96 MG/DL (ref 0.5–1.4)
EOSINOPHIL # BLD AUTO: 0.19 K/UL (ref 0–0.51)
EOSINOPHIL NFR BLD: 2.6 % (ref 0–6.9)
ERYTHROCYTE [DISTWIDTH] IN BLOOD BY AUTOMATED COUNT: 41.1 FL (ref 35.9–50)
GLOBULIN SER CALC-MCNC: 3.3 G/DL (ref 1.9–3.5)
GLUCOSE SERPL-MCNC: 124 MG/DL (ref 65–99)
GLUCOSE UR STRIP.AUTO-MCNC: NEGATIVE MG/DL
HCT VFR BLD AUTO: 46 % (ref 42–52)
HETEROPH AB SER QL: NEGATIVE
HGB BLD-MCNC: 15.9 G/DL (ref 14–18)
IMM GRANULOCYTES # BLD AUTO: 0.02 K/UL (ref 0–0.11)
IMM GRANULOCYTES NFR BLD AUTO: 0.3 % (ref 0–0.9)
KETONES UR STRIP.AUTO-MCNC: NEGATIVE MG/DL
LEUKOCYTE ESTERASE UR QL STRIP.AUTO: NEGATIVE
LIPASE SERPL-CCNC: 25 U/L (ref 11–82)
LYMPHOCYTES # BLD AUTO: 2.92 K/UL (ref 1–4.8)
LYMPHOCYTES NFR BLD: 40.3 % (ref 22–41)
MCH RBC QN AUTO: 30.9 PG (ref 27–33)
MCHC RBC AUTO-ENTMCNC: 34.6 G/DL (ref 33.7–35.3)
MCV RBC AUTO: 89.3 FL (ref 81.4–97.8)
METHADONE UR QL SCN: NEGATIVE
MICRO URNS: NORMAL
MONOCYTES # BLD AUTO: 0.67 K/UL (ref 0–0.85)
MONOCYTES NFR BLD AUTO: 9.2 % (ref 0–13.4)
NEUTROPHILS # BLD AUTO: 3.32 K/UL (ref 1.82–7.42)
NEUTROPHILS NFR BLD: 45.8 % (ref 44–72)
NITRITE UR QL STRIP.AUTO: NEGATIVE
NRBC # BLD AUTO: 0 K/UL
NRBC BLD-RTO: 0 /100 WBC
OPIATES UR QL SCN: NEGATIVE
OXYCODONE UR QL SCN: NEGATIVE
PCP UR QL SCN: NEGATIVE
PH UR STRIP.AUTO: 5 [PH]
PLATELET # BLD AUTO: 476 K/UL (ref 164–446)
PMV BLD AUTO: 8.3 FL (ref 9–12.9)
POTASSIUM SERPL-SCNC: 4 MMOL/L (ref 3.6–5.5)
PROPOXYPH UR QL SCN: NEGATIVE
PROT SERPL-MCNC: 7 G/DL (ref 6–8.2)
PROT UR QL STRIP: NEGATIVE MG/DL
RBC # BLD AUTO: 5.15 M/UL (ref 4.7–6.1)
RBC UR QL AUTO: NEGATIVE
SODIUM SERPL-SCNC: 137 MMOL/L (ref 135–145)
SP GR UR STRIP.AUTO: 1.01
UROBILINOGEN UR STRIP.AUTO-MCNC: 0.2 MG/DL
WBC # BLD AUTO: 7.3 K/UL (ref 4.8–10.8)

## 2018-12-20 PROCEDURE — 73120 X-RAY EXAM OF HAND: CPT | Mod: LT

## 2018-12-20 PROCEDURE — 99284 EMERGENCY DEPT VISIT MOD MDM: CPT

## 2018-12-20 PROCEDURE — 81003 URINALYSIS AUTO W/O SCOPE: CPT | Mod: XU

## 2018-12-20 PROCEDURE — 700102 HCHG RX REV CODE 250 W/ 637 OVERRIDE(OP): Performed by: EMERGENCY MEDICINE

## 2018-12-20 PROCEDURE — 85025 COMPLETE CBC W/AUTO DIFF WBC: CPT

## 2018-12-20 PROCEDURE — 80053 COMPREHEN METABOLIC PANEL: CPT

## 2018-12-20 PROCEDURE — 83690 ASSAY OF LIPASE: CPT

## 2018-12-20 PROCEDURE — 86308 HETEROPHILE ANTIBODY SCREEN: CPT

## 2018-12-20 PROCEDURE — 80307 DRUG TEST PRSMV CHEM ANLYZR: CPT

## 2018-12-20 PROCEDURE — 36415 COLL VENOUS BLD VENIPUNCTURE: CPT

## 2018-12-20 PROCEDURE — A9270 NON-COVERED ITEM OR SERVICE: HCPCS | Performed by: EMERGENCY MEDICINE

## 2018-12-20 RX ORDER — NICOTINE 21 MG/24HR
1 PATCH, TRANSDERMAL 24 HOURS TRANSDERMAL ONCE
Status: DISCONTINUED | OUTPATIENT
Start: 2018-12-20 | End: 2018-12-20 | Stop reason: HOSPADM

## 2018-12-20 RX ORDER — ONDANSETRON 4 MG/1
4 TABLET, ORALLY DISINTEGRATING ORAL ONCE
Qty: 10 TAB | Refills: 0 | Status: SHIPPED
Start: 2018-12-20 | End: 2018-12-20

## 2018-12-20 RX ADMIN — NICOTINE TRANSDERMAL SYSTEM 1 PATCH: 21 PATCH, EXTENDED RELEASE TRANSDERMAL at 17:15

## 2018-12-20 NOTE — ED TRIAGE NOTES
"Chief Complaint   Patient presents with   • Fatigue   • Nausea     Patient states he was admitted for infection to left thumb and taken to surgery on 12/6. States since discharge he has been fatigued, says it has gotten worse over past 4 days, \"I can't stay awake\".   Patient missed f/u appointment with Ortho, still has staples in place from surgery.   "

## 2018-12-20 NOTE — ED PROVIDER NOTES
"ED Provider Note    CHIEF COMPLAINT  Chief Complaint   Patient presents with   • Fatigue   • Nausea       HPI  Hamlet Islas is a 33 y.o. male here for evaluation of fatigue and intermittent nausea.  The patient states that he has been very tired over the last couple of weeks, especially after he had some surgery to his left thumb.  He has no fever, no vomiting, but does complain of some mild intermittent nausea.  He has no chest pain, no shortness of breath.  He has no back pain.  He denies any headache or neck pain.  He states that he thinks his sutures \"fell out of his thumb.\"  He was unable to make his appointment with his orthopedic doctor, prior to having the stitches removed.    PAST MEDICAL HISTORY   has a past medical history of Asthma; Bipolar disorder ([facility]); Bipolar disorder with depression ([facility]) (9/8/2014); Chronic hip pain (9/8/2014); Chronic low back pain; Migraine; Neuropathy ([facility]); Obsessive compulsive disorder; Post traumatic stress disorder (PTSD); Psoriasis; Schizoaffective disorder ([facility]); Scoliosis (9/8/2014); and Tendinitis of foot.    SOCIAL HISTORY  Social History     Social History Main Topics   • Smoking status: Current Every Day Smoker     Packs/day: 0.50     Types: Cigarettes   • Smokeless tobacco: Never Used   • Alcohol use Yes      Comment: rare   • Drug use: Yes      Comment: meth and marijuana   • Sexual activity: Yes     Partners: Female       SURGICAL HISTORY   has a past surgical history that includes other and irrigation & debridement ortho (Left, 12/6/2018).    CURRENT MEDICATIONS  Home Medications    **Home medications have not yet been reviewed for this encounter**         ALLERGIES  No Known Allergies    REVIEW OF SYSTEMS  See HPI for further details. Review of systems as above, otherwise all other systems are negative.     PHYSICAL EXAM  VITAL SIGNS: /70   Pulse 83   Temp 37 °C (98.6 °F) (Temporal)   Resp 17   Ht 1.778 m (5' 10\")   Wt 82.1 kg (181 lb)   SpO2 " 98%   BMI 25.97 kg/m²     Constitutional: Well developed, well nourished. No acute distress.  HEENT: Normocephalic, atraumatic. MMM  Neck: Supple, Full range of motion   Chest/Pulmonary:  No respiratory distress.  Equal expansion   Musculoskeletal: No deformity, no edema, neurovascular intact.  Left hand; thumb with a healing wound lateral aspect of the base of the thumb.  No erythema, no purulent discharge.  Neurovascular intact distally.  No staples present.  Neuro: Clear speech, appropriate, cooperative, cranial nerves II-XII grossly intact.  Psych: Flat affect    Results for orders placed or performed during the hospital encounter of 12/20/18   COMP METABOLIC PANEL   Result Value Ref Range    Sodium 137 135 - 145 mmol/L    Potassium 4.0 3.6 - 5.5 mmol/L    Chloride 105 96 - 112 mmol/L    Co2 23 20 - 33 mmol/L    Anion Gap 9.0 0.0 - 11.9    Glucose 124 (H) 65 - 99 mg/dL    Bun 21 8 - 22 mg/dL    Creatinine 0.96 0.50 - 1.40 mg/dL    Calcium 8.9 8.5 - 10.5 mg/dL    AST(SGOT) 20 12 - 45 U/L    ALT(SGPT) 34 2 - 50 U/L    Alkaline Phosphatase 64 30 - 99 U/L    Total Bilirubin 0.6 0.1 - 1.5 mg/dL    Albumin 3.7 3.2 - 4.9 g/dL    Total Protein 7.0 6.0 - 8.2 g/dL    Globulin 3.3 1.9 - 3.5 g/dL    A-G Ratio 1.1 g/dL   CBC WITH DIFFERENTIAL   Result Value Ref Range    WBC 7.3 4.8 - 10.8 K/uL    RBC 5.15 4.70 - 6.10 M/uL    Hemoglobin 15.9 14.0 - 18.0 g/dL    Hematocrit 46.0 42.0 - 52.0 %    MCV 89.3 81.4 - 97.8 fL    MCH 30.9 27.0 - 33.0 pg    MCHC 34.6 33.7 - 35.3 g/dL    RDW 41.1 35.9 - 50.0 fL    Platelet Count 476 (H) 164 - 446 K/uL    MPV 8.3 (L) 9.0 - 12.9 fL    Neutrophils-Polys 45.80 44.00 - 72.00 %    Lymphocytes 40.30 22.00 - 41.00 %    Monocytes 9.20 0.00 - 13.40 %    Eosinophils 2.60 0.00 - 6.90 %    Basophils 1.80 0.00 - 1.80 %    Immature Granulocytes 0.30 0.00 - 0.90 %    Nucleated RBC 0.00 /100 WBC    Neutrophils (Absolute) 3.32 1.82 - 7.42 K/uL    Lymphs (Absolute) 2.92 1.00 - 4.80 K/uL    Monos  (Absolute) 0.67 0.00 - 0.85 K/uL    Eos (Absolute) 0.19 0.00 - 0.51 K/uL    Baso (Absolute) 0.13 (H) 0.00 - 0.12 K/uL    Immature Granulocytes (abs) 0.02 0.00 - 0.11 K/uL    NRBC (Absolute) 0.00 K/uL   LIPASE   Result Value Ref Range    Lipase 25 11 - 82 U/L   URINALYSIS CULTURE, IF INDICATED   Result Value Ref Range    Color Yellow     Character Clear     Specific Gravity 1.014 <1.035    Ph 5.0 5.0 - 8.0    Glucose Negative Negative mg/dL    Ketones Negative Negative mg/dL    Protein Negative Negative mg/dL    Bilirubin Negative Negative    Urobilinogen, Urine 0.2 Negative    Nitrite Negative Negative    Leukocyte Esterase Negative Negative    Occult Blood Negative Negative    Micro Urine Req see below    URINE DRUG SCREEN   Result Value Ref Range    Amphetamines Urine Negative Negative    Barbiturates Negative Negative    Benzodiazepines Negative Negative    Cocaine Metabolite Negative Negative    Methadone Negative Negative    Opiates Negative Negative    Oxycodone Negative Negative    Phencyclidine -Pcp Negative Negative    Propoxyphene Negative Negative    Cannabinoid Metab Negative Negative   ESTIMATED GFR   Result Value Ref Range    GFR If African American >60 >60 mL/min/1.73 m 2    GFR If Non African American >60 >60 mL/min/1.73 m 2   MONONUCLEOSIS TEST QUAL   Result Value Ref Range    Heterophile Screen Negative Negative     DX-HAND 2- LEFT   Final Result      No evidence of fracture or dislocation.            PROCEDURES     MEDICAL RECORD  I have reviewed patient's medical record and pertinent results are listed above.    COURSE & MEDICAL DECISION MAKING  I have reviewed any medical record information, laboratory studies and radiographic results as noted above.    I you have had any blood pressure issues while here in the emergency department, please see your doctor for a further evaluation or work up.    The patient is comfortable, sleeping, and afebrile.  At this time he will be discharged for follow-up,  and will return here for any further issues or concerns.  He has no acute findings on his lab work, and he is comfortable with the plan.    6:06 PM  The pt is nontoxic appearing, afebrile, and has no acute findings on labs.  His x rays are unremarkable, and he has no signs of infection. I will have him follow up, or return here for any further issues or concerns.   He feels better, and would like to go home.     Differential diagnoses include but not limited to: fatigue. Uri, pneumonia, sepsis,     This patient presents with fatigue.  At this time, I have counseled the patient/family regarding their medications, pain control, and follow up.  They will continue their medications, if any, as prescribed.  They will return immediately for any worsening symptoms and/or any other medical concerns.  They will see their doctor, or contact the doctor provided, in 1-2 days for follow up.       FINAL IMPRESSION  Fatigue       Electronically signed by: Florencio Pro, 12/20/2018 2:22 PM

## 2018-12-20 NOTE — ED NOTES
Pt reports drowsiness and general malaise. Left thumb sutures to be removed still. Pt is alert and oriented. Straight stick for lab draw.

## 2018-12-21 NOTE — ED NOTES
Pt requesting food and water. Pt informed he cannot have anything by mouth until we rule out surgery. Pt agreeable. Pt requesting to go outside and smoke, pt offered a nicotine patch instead and agrees to this.

## 2019-01-02 ENCOUNTER — APPOINTMENT (OUTPATIENT)
Dept: NEUROLOGY | Facility: MEDICAL CENTER | Age: 34
End: 2019-01-02
Payer: MEDICARE

## 2019-01-06 ENCOUNTER — APPOINTMENT (OUTPATIENT)
Dept: RADIOLOGY | Facility: MEDICAL CENTER | Age: 34
End: 2019-01-06
Attending: EMERGENCY MEDICINE
Payer: MEDICARE

## 2019-01-06 ENCOUNTER — HOSPITAL ENCOUNTER (EMERGENCY)
Facility: MEDICAL CENTER | Age: 34
End: 2019-01-06
Attending: EMERGENCY MEDICINE
Payer: MEDICARE

## 2019-01-06 VITALS
HEIGHT: 69 IN | SYSTOLIC BLOOD PRESSURE: 118 MMHG | DIASTOLIC BLOOD PRESSURE: 74 MMHG | BODY MASS INDEX: 26.96 KG/M2 | OXYGEN SATURATION: 96 % | TEMPERATURE: 98.1 F | RESPIRATION RATE: 19 BRPM | WEIGHT: 182 LBS | HEART RATE: 89 BPM

## 2019-01-06 DIAGNOSIS — S50.02XA CONTUSION OF LEFT ELBOW, INITIAL ENCOUNTER: ICD-10-CM

## 2019-01-06 DIAGNOSIS — S39.92XA INJURY OF LOW BACK, INITIAL ENCOUNTER: ICD-10-CM

## 2019-01-06 DIAGNOSIS — S60.221A CONTUSION OF RIGHT HAND, INITIAL ENCOUNTER: ICD-10-CM

## 2019-01-06 PROCEDURE — 73130 X-RAY EXAM OF HAND: CPT | Mod: RT

## 2019-01-06 PROCEDURE — 73110 X-RAY EXAM OF WRIST: CPT | Mod: LT

## 2019-01-06 PROCEDURE — 99284 EMERGENCY DEPT VISIT MOD MDM: CPT

## 2019-01-06 PROCEDURE — 73080 X-RAY EXAM OF ELBOW: CPT | Mod: LT

## 2019-01-06 PROCEDURE — 700102 HCHG RX REV CODE 250 W/ 637 OVERRIDE(OP): Performed by: EMERGENCY MEDICINE

## 2019-01-06 PROCEDURE — 72100 X-RAY EXAM L-S SPINE 2/3 VWS: CPT

## 2019-01-06 PROCEDURE — A9270 NON-COVERED ITEM OR SERVICE: HCPCS | Performed by: EMERGENCY MEDICINE

## 2019-01-06 PROCEDURE — 96372 THER/PROPH/DIAG INJ SC/IM: CPT

## 2019-01-06 PROCEDURE — 700111 HCHG RX REV CODE 636 W/ 250 OVERRIDE (IP): Performed by: EMERGENCY MEDICINE

## 2019-01-06 RX ORDER — KETOROLAC TROMETHAMINE 30 MG/ML
30 INJECTION, SOLUTION INTRAMUSCULAR; INTRAVENOUS ONCE
Status: COMPLETED | OUTPATIENT
Start: 2019-01-06 | End: 2019-01-06

## 2019-01-06 RX ORDER — ACETAMINOPHEN 325 MG/1
650 TABLET ORAL ONCE
Status: COMPLETED | OUTPATIENT
Start: 2019-01-06 | End: 2019-01-06

## 2019-01-06 RX ADMIN — KETOROLAC TROMETHAMINE 30 MG: 30 INJECTION, SOLUTION INTRAMUSCULAR at 14:30

## 2019-01-06 RX ADMIN — ACETAMINOPHEN 650 MG: 325 TABLET, FILM COATED ORAL at 14:10

## 2019-01-06 ASSESSMENT — PAIN SCALES - GENERAL
PAINLEVEL_OUTOF10: 6
PAINLEVEL_OUTOF10: 6

## 2019-01-06 NOTE — ED NOTES
Discharge orders received from ERP. Provided education on discharge instructions and diagnosis.Pt demonstrated understanding of education. Pt verbalized understanding of need for follow up appointment.  Pt ambulatory off unit with all personal belongings.

## 2019-01-06 NOTE — ED PROVIDER NOTES
"ED Provider Note    Scribed for Osmar Panda M.D. by Vickie Pantoja. 1/6/2019  1:53 PM    Means of arrival: ambulance  History obtained from: patient  History limited by: none    CHIEF COMPLAINT  Chief Complaint   Patient presents with   • T-5000     Pt BIB EMS. pt jumped wall and was wedged between wall and dumpster on other side, pt fell aprox. 8 ft.    • Hand Pain     RT hand   • Back Pain     RT back   • Elbow Pain     LT elbow       HPI    Hamlet Islas is a 33 y.o. male presenting for evaluation following a fall off of a wall that occurred just prior to arrival in the ED. Patient was on top of an 8ft cement wall, when he went to step onto a metal dumpster. His foot slipped, and he fell wedging himself in between the dumpster and the wall. Patient reports he was stuck \"folded up like an accordion.\" He did strike the back of his head, but did not lose consciousness, is acting appropriately, and has experienced no nausea or vomiting since the incident. Complaints at this time include right lower back pain, right hand pain, left elbow pain all rated as a sharp 9/10 discomfort. He states that when he hit the ground, he also experiencing an incontinence of bowels. No complaints saddle anesthesia. Tetanus is up to date.    REVIEW OF SYSTEMS  See HPI for further details.   Pertinent positives include: right hand pain, left elbow pain, right lower back pain, fall, bowel incontinence with fall  Pertinent negative include: LOC, nausea, vomiting, behavioral changes, saddle anesthesia.  10 + review of systems otherwise negative     PAST MEDICAL HISTORY   has a past medical history of Asthma; Bipolar disorder (HCC); Bipolar disorder with depression (HCC) (9/8/2014); Chronic hip pain (9/8/2014); Chronic low back pain; Migraine; Neuropathy ([facility]); Obsessive compulsive disorder; Post traumatic stress disorder (PTSD); Psoriasis; Schizoaffective disorder ([facility]); Scoliosis (9/8/2014); and Tendinitis of foot.    SOCIAL " "HISTORY  Social History     Social History Main Topics   • Smoking status: Current Every Day Smoker     Packs/day: 0.50     Types: Cigarettes   • Smokeless tobacco: Never Used   • Alcohol use Yes      Comment: rare   • Drug use: Yes      Comment: meth and marijuana   • Sexual activity: Yes     Partners: Female       SURGICAL HISTORY   has a past surgical history that includes other and irrigation & debridement ortho (Left, 12/6/2018).    CURRENT MEDICATIONS  No current facility-administered medications for this encounter.     Current Outpatient Prescriptions:   •  Cephalexin (KEFLEX PO), Take  by mouth., Disp: , Rfl:   •  levETIRAcetam (KEPPRA) 500 MG Tab, Take 1 Tab by mouth 2 times a day., Disp: 60 Tab, Rfl: 1  •  clindamycin (CLEOCIN) 300 MG Cap, Take 1 Cap by mouth 3 times a day for 28 days., Disp: 84 Cap, Rfl: 0  •  busPIRone (BUSPAR) 7.5 MG tablet, Take 7.5 mg by mouth 2 times a day., Disp: , Rfl:   •  naproxen (NAPROSYN) 500 MG Tab, TAKE 1 TABLET BY MOUTH TWICE DAILY AS NEEDED, Disp: 180 Tab, Rfl: 0    ALLERGIES  No Known Allergies    PHYSICAL EXAM  VITAL SIGNS: /73   Pulse (!) 103   Temp 36.8 °C (98.3 °F) (Temporal)   Resp 16   Ht 1.753 m (5' 9\")   Wt 82.6 kg (182 lb)   BMI 26.88 kg/m²      SpO2: I interpret this pulse oximetry as normal  Constitutional: Well developed, Well nourished, mild painful distress with movement, Non-toxic appearance.   HENT: Normocephalic, Atraumatic, edentulous, no jaw malocclusion, Bilateral external ears normal, Oropharynx moist, No oral exudates.   Eyes: PERRLA, EOMI, Conjunctiva normal, No discharge.   Neck: No C spine tenderness, or step off. Supple, No stridor.   Lymphatic: No lymphadenopathy noted.   Cardiovascular: Normal heart rate, Normal rhythm.   Thorax & Lungs: Clear to auscultation bilaterally, No respiratory distress, No wheezing, No crackles.   Abdomen: Soft, No tenderness, No masses, No pulsatile masses.   Skin: Warm, Dry, No erythema, No rash. "   Extremities:, FROM of left elbow with no obvious joint effusion, no focal bony tenderness, FROM of left wrist. Tenderness, swelling and abrasion of medial right hand, no snuff box or carpal tenderness, full extension and flexion of the fingers.No cyanosis.   Musculoskeletal: right sided lower paraspinal tenderness, no CTL spine tenderness or step off, No major deformities noted.  Intact distal pulses  Neurologic: Awake, alert. Moves all extremities spontaneously.  Psychiatric: Affect normal, Judgment normal, Mood normal.     DIAGNOSTIC STUDIES / PROCEDURES  RADIOLOGY    DX-WRIST-COMPLETE 3+ LEFT   Final Result      Negative left wrist series      DX-ELBOW-COMPLETE 3+ LEFT   Final Result      Negative left elbow series      DX-HAND 3+ RIGHT   Final Result      Negative right hand series      DX-LUMBAR SPINE-2 OR 3 VIEWS   Final Result      Negative for lumbar spine fracture or subluxation          Right hand x-ray, 3 views: No bony abnormalities, no malalignment.  Joint space is stable and intact.  No prior films were immediately available for comparison.  Impression: Normal right hand x-ray         CHART REVIEW  Pertinent medical chart information was reviewed and reveals: last ED evaluation 12/20/18 for nausea and fatigue    COURSE & MEDICAL DECISION MAKING  Pertinent Labs & Imaging studies reviewed. (See chart for details)    Differential diagnoses include but not limited to: Contusion, abrasion, fracture, dislocation, muscle strain    1:53 PM - Patient seen and examined at bedside. Discussed plan of care, including managing his pain with antiinflammatories here in the ED and evaluating for any sustained injury with imaging. Patient agrees to the plan of care. The patient will be medicated with 650mg Tylenol tablet 30mg IM Toradol. Ordered for right hand, left elbow, left wrist, L spine xray to evaluate his symptoms.     Imaging with no indications for injury. Patient will be discharged    Vitals:    01/06/19  "1346 01/06/19 1400 01/06/19 1500 01/06/19 1540   BP: 114/73 110/80 116/77 118/74   Pulse: (!) 103 92 85 89   Resp: 16 15 19    Temp: 36.8 °C (98.3 °F)  36.7 °C (98.1 °F)    TempSrc: Temporal  Temporal    SpO2:  95% 99% 96%   Weight: 82.6 kg (182 lb)      Height: 1.753 m (5' 9\")          Medications   acetaminophen (TYLENOL) tablet 650 mg (650 mg Oral Given 1/6/19 1410)   ketorolac (TORADOL) injection 30 mg (30 mg Intramuscular Given 1/6/19 1430)     33-year-old male presenting with low level fall and multiple musculoskeletal complaints.  Vital signs and exam reassuring, no evidence of severe intracranial, intrathoracic, intra-abdominal injury, no signs of fracture based on exam.  No evidence of severe head injury, no indications for head or neck imaging.  Extremity imaging is negative.  Pain well controlled with Toradol and Tylenol.  No red flag signs or symptoms for back pain, imaging negative, ambulating normally and no focal neurological deficits.  Remains stable without evidence of serious traumatic injury, did have a tiny abrasion, no wounds requiring repair, tetanus is already up-to-date.  Patient or guardian given strict returns precautions and care instructions.  Advised PCP follow-up in 1-2 days.  Patient or guardian expresses understanding and agrees to plan.    DISPOSITION  The patient will return for new or worsening symptoms and is stable at the time of discharge.    DISPOSITION:  Patient will be discharged home in stable condition.    FOLLOW UP:  NANCY Cartagena  [address] [place] [address]  [facility] [id number]  [phone]    Schedule an appointment as soon as possible for a visit in 2 days        FINAL IMPRESSION  Visit Diagnoses     ICD-10-CM   1. Contusion of right hand, initial encounter S60.221A   2. Contusion of left elbow, initial encounter S50.02XA   3. Injury of low back, initial encounter S39.92XA        I, Vickie Pantoja (Scribe), am scribing for, and in the presence of, Osmar ESPARZA" MANJIT Panda.    Electronically signed by: Vickie Pantoja (Scribe), 1/6/2019    IOsmar M.D. personally performed the services described in this documentation, as scribed by Vickie Pantoja in my presence, and it is both accurate and complete.    The note accurately reflects work and decisions made by me.  Osmar Panda  1/6/2019  6:45 PM

## 2019-01-06 NOTE — ED TRIAGE NOTES
Chief Complaint   Patient presents with   • T-5000     Pt BIB EMS. pt jumped wall and was wedged between wall and dumpster on other side, pt fell aprox. 8 ft.    • Hand Pain     RT hand   • Back Pain     RT back   • Elbow Pain     LT elbow

## 2019-01-10 ENCOUNTER — TELEPHONE (OUTPATIENT)
Dept: INFECTIOUS DISEASES | Facility: MEDICAL CENTER | Age: 34
End: 2019-01-10

## 2019-01-10 NOTE — TELEPHONE ENCOUNTER
Called and LM for pt with his mother to return my call to re-schedule a missed follow up appointment. YUNG

## 2019-01-11 ENCOUNTER — HOSPITAL ENCOUNTER (EMERGENCY)
Facility: MEDICAL CENTER | Age: 34
End: 2019-01-12
Attending: EMERGENCY MEDICINE

## 2019-01-11 DIAGNOSIS — T68.XXXA HYPOTHERMIA, INITIAL ENCOUNTER: ICD-10-CM

## 2019-01-11 DIAGNOSIS — F15.10 METHAMPHETAMINE ABUSE (HCC): ICD-10-CM

## 2019-01-11 PROCEDURE — 700102 HCHG RX REV CODE 250 W/ 637 OVERRIDE(OP): Performed by: EMERGENCY MEDICINE

## 2019-01-11 PROCEDURE — A9270 NON-COVERED ITEM OR SERVICE: HCPCS | Performed by: EMERGENCY MEDICINE

## 2019-01-11 RX ORDER — LORAZEPAM 1 MG/1
1 TABLET ORAL ONCE
Status: COMPLETED | OUTPATIENT
Start: 2019-01-12 | End: 2019-01-11

## 2019-01-11 RX ORDER — BUSPIRONE HYDROCHLORIDE 7.5 MG/1
7.5 TABLET ORAL 2 TIMES DAILY
Qty: 180 TAB | Refills: 0 | Status: SHIPPED | OUTPATIENT
Start: 2019-01-11 | End: 2019-01-21 | Stop reason: SDUPTHER

## 2019-01-11 RX ADMIN — LORAZEPAM 1 MG: 1 TABLET ORAL at 23:43

## 2019-01-12 VITALS
HEART RATE: 98 BPM | OXYGEN SATURATION: 99 % | TEMPERATURE: 96.9 F | RESPIRATION RATE: 16 BRPM | DIASTOLIC BLOOD PRESSURE: 78 MMHG | SYSTOLIC BLOOD PRESSURE: 147 MMHG | WEIGHT: 140 LBS | HEIGHT: 70 IN | BODY MASS INDEX: 20.04 KG/M2

## 2019-01-12 PROCEDURE — 99284 EMERGENCY DEPT VISIT MOD MDM: CPT

## 2019-01-12 NOTE — ED PROVIDER NOTES
"ED Provider Note    ED Provider Note      Primary care provider: No primary care provider on file.    CHIEF COMPLAINT  Chief Complaint   Patient presents with   • Legal 2000     brought in by PD for concerns pt was in the cold and not able to take care of himself and that he was altered with unknown cause       HPI  Hamlet Islas is a 34 y.o. male who presents to the Emergency Department with chief complaint of legal 2000.  Patient was brought in by police department for what they described as failure to care for self.  Patient was found in the cold weather with inappropriate clothing and refused to communicate with authorities or EMS.  Was found to be slightly hypothermic at 95.4 and was transported here for further evaluation and treatment.  Upon my evaluation patient has no complaints he denies suicidal ideation denies homicidal he does admit to using methamphetamine tonight.  He has had no fevers no chills no cough no shortness of breath no abdominal pain no urinary complaints no stooling complaints no other symptoms or concerns.    REVIEW OF SYSTEMS  10 systems reviewed and otherwise negative, pertinent positives and negatives listed in the history of present illness.    PAST MEDICAL HISTORY   None    SURGICAL HISTORY  patient denies any surgical history    SOCIAL HISTORY     Smoke cigarettes drinks alcohol socially abuses methamphetamine      FAMILY HISTORY    Non-Contributory    CURRENT MEDICATIONS  None    ALLERGIES  No Known Allergies    PHYSICAL EXAM  VITAL SIGNS: /96   Pulse 91   Temp (!) 35.2 °C (95.4 °F) (Oral)   Resp 16   Ht 1.778 m (5' 10\")   Wt 63.5 kg (140 lb)   BMI 20.09 kg/m²   Pulse ox interpretation: I interpret this pulse ox as normal.  Constitutional: Alert and oriented x 3, minimal distress  HEENT: Atraumatic normocephalic, pupils are equal round reactive to light extraocular movements are intact. The nares is clear, external ears are normal, mouth shows moist mucous " "membranes  Neck: Supple, no JVD no tracheal deviation  Cardiovascular: Regular rate and rhythm no murmur rub or gallop 2+ pulses peripherally x4  Thorax & Lungs: No respiratory distress, no wheezes rales or rhonchi, No chest tenderness.   GI: Soft nontender nondistended positive bowel sounds, no peritoneal signs  Skin: Warm dry no acute rash or lesion  Musculoskeletal: Moving all extremities with full range and 5 of 5 strength, no acute  deformity  Neurologic: Cranial nerves III through XII are grossly intact, no sensory deficit, no cerebellar dysfunction   Psychiatric: Appropriate affect for situation at this time      DIAGNOSTIC STUDIES / PROCEDURES            COURSE & MEDICAL DECISION MAKING  Pertinent Labs & Imaging studies reviewed. (See chart for details)    11:29 PM - Patient seen and examined at bedside.         Prescription monitoring program queried and unremarkable.  Patient counseled on the risks of controlled substances including potential risks and benefits proper use alternative treatments, cause the symptoms, provisions of treatment plan, risk of dependence addiction and overdose method safely dispose of the medication, the fact that they would be given no refills from the emergency department.     Patient noted to have slightly elevated blood pressure likely circumstantial secondary to presenting complaint. Referred to primary care physician for further evaluation.    Medical Decision Making: Patient had improvement of his temperatures he is eager to leave at this time I see no indication that this patient has any indication to be on legal hold or medically evaluated at this time pulses decreased his temperature is improved discharged in stable and improved condition return for worsening symptoms or concerns.    /78   Pulse 98   Temp 36.1 °C (96.9 °F) (Oral)   Resp 16   Ht 1.778 m (5' 10\")   Wt 63.5 kg (140 lb)   SpO2 99%   BMI 20.09 kg/m²         FINAL IMPRESSION  1. Methamphetamine " abuse ([facility]) Active   2. Hypothermia, initial encounter Active         This dictation has been created using voice recognition software and/or scribes. The accuracy of the dictation is limited by the abilities of the software and the expertise of the scribes. I expect there may be some errors of grammar and possibly content. I made every attempt to manually correct the errors within my dictation. However, errors related to voice recognition software and/or scribes may still exist and should be interpreted within the appropriate context.

## 2019-01-12 NOTE — ED TRIAGE NOTES
Pt brought in by PD on a legal 2000. Pt was reportedly trespassing and when PD went to investigate pt was unable/unwilling to communicate with PD. He did refuse medical treatment from EMS while in the field.  Pt not answering questions on arrival but denies SI/HI.

## 2019-01-12 NOTE — ED NOTES
Discharge papers provided, pt has all belongings in his possession, security called to assist pt to lobby

## 2019-01-21 RX ORDER — BUSPIRONE HYDROCHLORIDE 7.5 MG/1
7.5 TABLET ORAL 2 TIMES DAILY
Qty: 180 TAB | Refills: 0 | Status: SHIPPED | OUTPATIENT
Start: 2019-01-21 | End: 2019-09-22 | Stop reason: SDUPTHER

## 2019-02-14 ENCOUNTER — HOSPITAL ENCOUNTER (EMERGENCY)
Facility: MEDICAL CENTER | Age: 34
End: 2019-02-16
Attending: EMERGENCY MEDICINE
Payer: MEDICARE

## 2019-02-14 DIAGNOSIS — F29 PSYCHOSIS, UNSPECIFIED PSYCHOSIS TYPE (HCC): ICD-10-CM

## 2019-02-14 LAB
AMPHET UR QL SCN: POSITIVE
BARBITURATES UR QL SCN: NEGATIVE
BENZODIAZ UR QL SCN: NEGATIVE
BZE UR QL SCN: NEGATIVE
CANNABINOIDS UR QL SCN: NEGATIVE
METHADONE UR QL SCN: NEGATIVE
OPIATES UR QL SCN: NEGATIVE
OXYCODONE UR QL SCN: NEGATIVE
PCP UR QL SCN: NEGATIVE
POC BREATHALIZER: 0 PERCENT (ref 0–0.01)
PROPOXYPH UR QL SCN: NEGATIVE

## 2019-02-14 PROCEDURE — A9270 NON-COVERED ITEM OR SERVICE: HCPCS | Performed by: EMERGENCY MEDICINE

## 2019-02-14 PROCEDURE — 700102 HCHG RX REV CODE 250 W/ 637 OVERRIDE(OP): Performed by: EMERGENCY MEDICINE

## 2019-02-14 PROCEDURE — 302970 POC BREATHALIZER: Performed by: EMERGENCY MEDICINE

## 2019-02-14 PROCEDURE — 99285 EMERGENCY DEPT VISIT HI MDM: CPT

## 2019-02-14 PROCEDURE — 80307 DRUG TEST PRSMV CHEM ANLYZR: CPT

## 2019-02-14 RX ORDER — DIPHENHYDRAMINE HCL 25 MG
25 TABLET ORAL ONCE
Status: COMPLETED | OUTPATIENT
Start: 2019-02-14 | End: 2019-02-14

## 2019-02-14 RX ORDER — HALOPERIDOL 5 MG/1
5 TABLET ORAL ONCE
Status: COMPLETED | OUTPATIENT
Start: 2019-02-14 | End: 2019-02-14

## 2019-02-14 RX ADMIN — HALOPERIDOL 5 MG: 5 TABLET ORAL at 13:00

## 2019-02-14 RX ADMIN — DIPHENHYDRAMINE HCL 25 MG: 25 TABLET ORAL at 13:00

## 2019-02-14 ASSESSMENT — ENCOUNTER SYMPTOMS
NERVOUS/ANXIOUS: 0
FEVER: 0

## 2019-02-14 NOTE — ED NOTES
This assist RN chaperoned pt get undressed and put all belongings in pt belongings bags. This RN contacted security per protocol to search patients belongings. Security at bedside. Pt compliant. Pt room is cleared for safety.     Justice, officer from security came to this RN with concerns about patients belongings. Justice officer from security found young girl leggings, a stuffed animal and a personal note on white paper with two smears of a red substance which appeared like blood. Security also noted blood on patients clothing as well.    This RN notified Charge RN about pts belongings. Charge RN notified ER Manager.

## 2019-02-14 NOTE — CONSULTS
"[facility]   TRIAGE ASSESSMENT    Name: Hamlet Islas  MRN: [id number]  : 1985  Age: 33 y.o.  Date of assessment: 2019  PCP: BASHIR Cartagena.  Persons in attendance: Patient    CHIEF COMPLAINT/PRESENTING ISSUE (as stated by patient): 33 year old [employer], legal hold, walked into traffic with SI; pt alert, oriented to self, place, date; anxious;preoccupied thoughts; decreased eye contact; bizarre behaviors, picking at his toenails during evaluation; then pointed to the ED room lamp and stated \"I see my queen, my baby\"; states he is here b/c he \"tried to run in front of a car today\" d/t relational issue with his wife; when writer RN asked pt what the issue was he stated \"I don't know, we are having issues, I don't know if I have a wife anymore after today, I ruined Valenitine's, I wasn't supportive\"'; pt currently denies SI, HI, or self-harm ideation, states he is \"depressed\"; states \"I wanted to die at the time but it was a dumb thought, I have children\"; giving vague, unclear, scattered answers to questions asked which required writer RN to repeat questions and ask pt for specifics which pt had difficulty doing; states h/o 2019 , \"OD on meth and pills\"  Which he states he did not report to anyone; pt does have an [facility] ED visit 19, pt [employer], inability to care for self and reported meth use, \" was found in the cold weather with inappropriate clothing and refused to communicate with authorities or EMS\"; pt states denies current outpt MH services but [facility] in 2018 (cristooot remember the date) and states he has had outpt MH services since age 7 and has been diagnosed with bipolar disorder; current psychiatric medication includes Buspirone 7/5 mg PO BID, lprescribed by PCP, Dr. Charlton, ast taken 1 week ago, states RX ran out and he had insurance issues and [facility] would not fill the RX; states h/o inpt MH treatment at [facility] in 2014 for \"alcohol and took increased Seroquel\" to feel better\" and denies this as a SA; cannot clearly state if has used an illicit substance, when asked, states \"may have taken acid or something, last night, this morning, I'm not sure, LSD, acid, or something\"; states [facility] since [facility], pays $750 monthly and lives with his wife of 4 years; states he has \"10 children, 7 biological, 3 step-children\"; states the kids are with his sister, Emelina, who is at the [facility], also; pt refused to give writer RN permission or sign YUDITH to talk to his wife or sister; pt states he received $846/month disability; states h/o 4 domestic violence charges towards current wife and previous, ex-wife, most recent towards wife 10/2018; also with h/o [facility] \"one month ago, drug possession\"  Pt. Later gave verbal consent to speak with his sister, Emelina, gave phone #[phone], and signed YUDITH to speak with sister; per Emelina, pt has h/o \"schizophrenia and bipolar\" and left the residence last night; sister worried about his safety and glad to hear he is safe; sister verified pt is  and has 10 children (as pt stated) with 3 children (ages 3,5, and 13) in the custody of pt's sister  Pt received Haldol 5 mg PO with Benadryl 25 mg PO at 1300      Chief Complaint   Patient presents with   • Suicidal Ideation        CURRENT LIVING SITUATION/SOCIAL SUPPORT: lives at the [facility] since 4/2018, pays $750 monthly and lives with his wife of 4 years    BEHAVIORAL HEALTH TREATMENT HISTORY  Does patient/parent report a history of prior behavioral health treatment for patient?   Yes:    Dates Level of Care Facilty/Provider Diagnosis/Problem Medications   1/2019 PCP       2018 outpt [facility] Bipolar d/o     2014 inpt [facility] Depressioin, Bipolar d/o     First outpt  age 7 outpt             SAFETY ASSESSMENT - SELF  Does patient acknowledge current or past symptoms " "of dangerousness to self? Yes-states h/o 1 SA 1/2019 , \"OD on meth and pills\"; states h/o 1 SA 1/2019 , \"OD on meth and pills\"  Does parent/significant other report patient has current or past symptoms of dangerousness to self? N\A  Does presenting problem suggest symptoms of dangerousness to self? Yes:     Past Current    Suicidal Thoughts: [x]  [x]    Suicidal Plans: [x]  [x]    Suicidal Intent: []  []    Suicide Attempts: [x]  [x]    Self-Injury []  []      For any boxes checked above, provide detail: states h/o 1 SA 1/2019 , \"OD on meth and pills\"; states h/o 1 SA 1/2019 , \"OD on meth and pills\"    History of suicide by family member: no  History of suicide by friend/significant other: no  Recent change in frequency/specificity/intensity of suicidal thoughts or self-harm behavior? yes - today d/t relational stress   Current access to firearms, medications, or other identified means of suicide/self-harm? no  If yes, willing to restrict access to means of suicide/self-harm? yes - no weapons or guns  Protective factors present:  Fear of suicide, Future-oriented, Hopefulness, Optimism, Reasons for living identified by patient: children and Willing to address in treatment    SAFETY ASSESSMENT - OTHERS  Does patient acknowledge current or past symptoms of aggressive behavior or risk to others? Yes-states h/o 4 domestic violence charges towards current wife and previous, ex-wife, most recent towards wife 10/2018  Does parent/significant other report patient has current or past symptoms of aggressive behavior or risk to others?  N\A  Does presenting problem suggest symptoms of dangerousness to others? No    Crisis Safety Plan completed and copy given to patient? no    ABUSE/NEGLECT SCREENING  Does patient report feeling “unsafe” in his/her home, or afraid of anyone?  no  Does patient report any history of physical, sexual, or emotional abuse?  no  Does parent or significant other report any of the above? N\A  Is there " "evidence of neglect by self?  no  Is there evidence of neglect by a caregiver? no  Does the patient/parent report any history of CPS/APS/police involvement related to suspected abuse/neglect or domestic violence? no  Based on the information provided during the current assessment, is a mandated report of suspected abuse/neglect being made?  No    SUBSTANCE USE SCREENING  Yes:  Jayden all substances used in the past 30 days:      Last Use Amount   []   Alcohol     []   Marijuana     []   Heroin     []   Prescription Opioids  (used without prescription, for    recreation, or in excess of prescribed amount)     []   Other Prescription  (used without prescription, for    recreation, or in excess of prescribed amount)     []   Cocaine      []   Methamphetamine     []   \"\" drugs (ectasy, MDMA)     [x]   Other substances  \"LSD , acid, or something\" 2/14/19 or 2/13/19 unknown      UDS results: + Amphetamines  Breathalyzer results: negative    What consequences does the patient associate with any of the above substance use and or addictive behaviors? Relationship problems:     Risk factors for detox (check all that apply):  []  Seizures   []  Diaphoretic (sweating)   []  Tremors   []  Hallucinations   []  Increased blood pressure   []  Decreased blood pressure   []  Other   [x]  None      [] Patient education on risk factors for detoxification and instructed to return to ER as needed.      MENTAL STATUS   Participation: Limited verbal participation, Guarded and Resistant  Grooming: Casual and Neat  Orientation: Alert and Disoriented to: recent events  Behavior: anxious  Eye contact: Poor  Mood: Depressed and Anxious  Affect: Flat  Thought process: Perseveration and scattered, preoccupied  Thought content: Preoccupation  Speech: Rate within normal limits and Soft  Perception: Evidence of hallucination  Memory:  Poor memory for chronology of events  Insight: Limited  Judgment:  Limited  Other:    Collateral information: "   Source:  [] Significant other present in person:   [] Significant other by telephone  [] [facility]   [x] [facility] Nursing Staff  [x] [facility]  [x] Other:  SisterEmelina, via telephone    [] Unable to complete full assessment due to:  [] Acute intoxication  [] Patient declined to participate/engage  [] Patient verbally unresponsive  [] Significant cognitive deficits  [] Significant perceptual distortions or behavioral disorganization  [] Other:      CLINICAL IMPRESSIONS:  Primary:  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  Secondary:  Bipolar disorder by history       IDENTIFIED NEEDS/PLAN:  [Trigger DISPOSITION list for any items marked]    [x]  Imminent safety risk - self [] Imminent safety risk - others   []  Acute substance withdrawal [x]  Psychosis/Impaired reality testing   []  Mood/anxiety [x]  Substance use/Addictive behavior   []  Maladaptive behaviro []  Parent/child conflict   []  Family/Couples conflict []  Biomedical   []  Housing []  Financial   []   Legal  Occupational/Educational   []  Domestic violence []  Other:     Disposition: Actively being [facility]    Does patient express agreement with the above plan? no    Referral appointment(s) scheduled? no    Alert team only:   I have discussed findings and recommendations with Dr. Almazan who is in agreement with these recommendations.     Referral information sent to the following community providers :NA    If applicable : Referred  to : Sohe[facility] 2/14/19  for legal hold follow up at (time): 1600      Jennifer Quigley R.N.  2/14/2019
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
## 2019-02-15 PROCEDURE — A9270 NON-COVERED ITEM OR SERVICE: HCPCS | Performed by: PSYCHIATRY & NEUROLOGY

## 2019-02-15 PROCEDURE — 99284 EMERGENCY DEPT VISIT MOD MDM: CPT | Performed by: PSYCHIATRY & NEUROLOGY

## 2019-02-15 PROCEDURE — 700102 HCHG RX REV CODE 250 W/ 637 OVERRIDE(OP): Performed by: PSYCHIATRY & NEUROLOGY

## 2019-02-15 RX ORDER — QUETIAPINE FUMARATE 100 MG/1
200 TABLET, FILM COATED ORAL EVERY EVENING
Status: DISCONTINUED | OUTPATIENT
Start: 2019-02-15 | End: 2019-02-16 | Stop reason: HOSPADM

## 2019-02-15 RX ORDER — RISPERIDONE 3 MG/1
3 TABLET ORAL 2 TIMES DAILY
COMMUNITY

## 2019-02-15 RX ORDER — QUETIAPINE FUMARATE 100 MG/1
100 TABLET, FILM COATED ORAL 2 TIMES DAILY
Status: DISCONTINUED | OUTPATIENT
Start: 2019-02-15 | End: 2019-02-16 | Stop reason: HOSPADM

## 2019-02-15 RX ADMIN — QUETIAPINE FUMARATE 200 MG: 100 TABLET ORAL at 19:04

## 2019-02-15 ASSESSMENT — ENCOUNTER SYMPTOMS
ABDOMINAL PAIN: 0
SHORTNESS OF BREATH: 0
FEVER: 0
HALLUCINATIONS: 1
CHILLS: 0

## 2019-02-15 ASSESSMENT — LIFESTYLE VARIABLES: SUBSTANCE_ABUSE: 1

## 2019-02-15 NOTE — ED NOTES
Talking with family on phone.  Voice elevated briefly.  Inst need to remain calm, quiet and to end call.  Cooperative and agreeable.
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
## 2019-02-15 NOTE — ED NOTES
Pt resting in bed, NAD. Pt put on shower list for tomorrow AM per Pt request.     Sitter outside pt room in direct obs.

## 2019-02-15 NOTE — DISCHARGE PLANNING
Medical Social Work    Referral: Legal Hold    Intervention: Legal Hold Paperwork given to SW by [employer] RN Jennifer    Legal Hold Initiated: Date: 2/14/19 Time: 1200    Patient’s Insurance Listed on Face Sheet: Medicare/Medicaid FFS    Referrals sent to: [facility], [facility], [facility], and [facility].     This referral contains the following information:  1) Face sheet __x__  2) Page 1 and Page 2 of Legal Hold __x__  3) Alert Team Assessment/Psych Assessment __x__  4) Head to toe physical exam __x__  5) Urine Drug Screen __x__  6) Blood Alcohol __x__  7) Vital signs _x___  8) Pregnancy test when applicable __na_  9) Medications list __x__    Plan: Patient will transfer to mental health facility once acceptance is obtained

## 2019-02-15 NOTE — ED NOTES
Calm and cooperative.  Sitter remains in place.  Call received from sister, pt talking quietly on phone.

## 2019-02-15 NOTE — ED NOTES
Med Rec completed per patient and home pharmacy ([facility])  Allergies reviewed  No ORAL antibiotics in last 30 days    Pt states unknown last doses due to being out of some of his medications

## 2019-02-15 NOTE — ED PROVIDER NOTES
ED Provider Note    02/15/19  0703  02/14/19  1724  02/14/19  1158          Vital Signs     Temp    36.3 °C (97.3 °F)  36.4 °C (97.6 °F)       Temp src      Temporal       BP  103/83  124/75  124/73       Pulse  81  93  96       Resp  16  16  16       SpO2  96 %  98 %         O2 Delivery  None (Room Air)           Level of Consciousness  None/Wide Awake           Patient here has no evidence of active medical issue other than primary psychiatric issues.  Patient is medically cleared and remains on a legal hold

## 2019-02-16 VITALS
HEART RATE: 90 BPM | WEIGHT: 165 LBS | TEMPERATURE: 97.1 F | SYSTOLIC BLOOD PRESSURE: 117 MMHG | RESPIRATION RATE: 16 BRPM | DIASTOLIC BLOOD PRESSURE: 79 MMHG | BODY MASS INDEX: 23.68 KG/M2 | OXYGEN SATURATION: 99 %

## 2019-02-16 PROCEDURE — A9270 NON-COVERED ITEM OR SERVICE: HCPCS | Performed by: PSYCHIATRY & NEUROLOGY

## 2019-02-16 PROCEDURE — A9270 NON-COVERED ITEM OR SERVICE: HCPCS | Performed by: EMERGENCY MEDICINE

## 2019-02-16 PROCEDURE — 700102 HCHG RX REV CODE 250 W/ 637 OVERRIDE(OP): Performed by: PSYCHIATRY & NEUROLOGY

## 2019-02-16 PROCEDURE — 90791 PSYCH DIAGNOSTIC EVALUATION: CPT

## 2019-02-16 PROCEDURE — 700102 HCHG RX REV CODE 250 W/ 637 OVERRIDE(OP): Performed by: EMERGENCY MEDICINE

## 2019-02-16 RX ORDER — LEVETIRACETAM 500 MG/1
500 TABLET ORAL 2 TIMES DAILY
Status: DISCONTINUED | OUTPATIENT
Start: 2019-02-16 | End: 2019-02-16 | Stop reason: HOSPADM

## 2019-02-16 RX ORDER — NICOTINE 21 MG/24HR
1 PATCH, TRANSDERMAL 24 HOURS TRANSDERMAL ONCE
Status: DISCONTINUED | OUTPATIENT
Start: 2019-02-16 | End: 2019-02-16 | Stop reason: HOSPADM

## 2019-02-16 RX ORDER — QUETIAPINE FUMARATE 100 MG/1
TABLET, FILM COATED ORAL
Qty: 120 TAB | Refills: 0 | Status: SHIPPED | OUTPATIENT
Start: 2019-02-16

## 2019-02-16 RX ADMIN — QUETIAPINE FUMARATE 100 MG: 100 TABLET ORAL at 05:26

## 2019-02-16 RX ADMIN — LEVETIRACETAM 500 MG: 500 TABLET ORAL at 05:26

## 2019-02-16 RX ADMIN — NICOTINE 1 PATCH: 21 PATCH, EXTENDED RELEASE TRANSDERMAL at 11:20

## 2019-02-16 NOTE — ED NOTES
Report received from Syeda RECINOS pt care assumed, pt currently sleeping, no needs at this time.

## 2019-02-16 NOTE — ED NOTES
Pt talking on phone intermittently throughout shift to family regarding planning and resources for discharge.  Requires time limit at onset of call, cooperative with set boundaries.

## 2019-02-16 NOTE — ED NOTES
Assumed care of patient, just finished eating dinner tray and asking to speak with wife before his evening meds start to take effect. Pleasant and cooperative at this time. Continuing to monitor.

## 2019-02-16 NOTE — ED PROVIDER NOTES
ER Provider Addendum Note     Scribed for FLORESITA GONZALEZ by Hoda Vásquez on 2/16/2019 at 10:53 AM.     This is an addendum to the note on Hamlet Islas.  For further details and full chart entry, see the previously signed ED Provider Note written by Dr. Socrates Almazan ([facility]).      10:53 AM On evaluation, patient is resting comfortably. He offers no medical complaints. Awaiting transfer at this time.          I, Hoda Vásquez (Scribe), am scribing for, and in the presence of, Floresita Gonzalez M.D.    Electronically signed by: Hoda Vásquez (Eribertoibalka), 2/16/2019    IFloresita M.D. personally performed the services described in this documentation, as scribed by Hoda Vásquez in my presence, and it is both accurate and complete.      The note accurately reflects work and decisions made by me.  Floresita Gonzalez  2/16/2019  11:15 AM

## 2019-02-16 NOTE — ED NOTES
Patient waking and requesting something to eat. Provided with boxed lunch. Cooperative with care at this time.

## 2019-02-16 NOTE — ED NOTES
"Pt request to delay Seroquel admin until with or after dinner stating, \"I don't want to be out and miss dinner.\"   "
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
## 2019-02-16 NOTE — DISCHARGE PLANNING
"    [facility]  Crisis/Safety Plan    Name:  Hamlet Islas  MRN:  [id number]  Date:  2019    Warning signs that a crisis may be developing for me or I may be at risk:  1) \"HALT\"  2)   3)    Coping strategies I can use on my own (relaxation, physical activity, etc):  1) play with my kids  2) working out  3)     Ways I can make my environment safe:  1)my environment is already safe  2)  3)    Things I want to tell myself when I feel a crisis developin) stick to your plan  2) follow through  3)    People I can contact for support or distraction (and their phone numbers):  1) Emelina [phone]  2) Mom [phone]  3)    If I’m not able to reach my support people, or the above strategies don’t help, I can contact the following professionals, agencies, or hotlines:  1) Crisis Call Center ():  [phone] -OR- (634) [phone]  2) Crisis Text Line ():  Text CARE TO 229656  3) Areli Tinajero  4) [facility]    Shital Joseph R.N.    "

## 2019-02-16 NOTE — ED NOTES
.  Hamlet Islas discharged via ambulation.  Discharge instructions given and reviewed, patient educated to follow up with [facility], verbalized understanding.  Prescriptions explained prescriptions called into [facility].  All personal belongings in possession.  No questions at this time.

## 2019-02-16 NOTE — DISCHARGE PLANNING
Alert Team  Spoke with Dr Jordan via text.  Per her note from yesterday, this pt could potentially be dc'd today, if behaviors appropriate.  Pt alert and oriented x4.  He denies SI, HI and hallucinations.  He is legally able to care for self.  He is able to voice his DC plan, which is to f/u at [facility], where he is scheduled to begin getting monthly depot injections of Abilify.  I advised him that they can likely set him up with his usual PO meds and continuing med mgmt, as he usually gets his meds from his PCP.  Advised he ask about counseling there, as well.  Gave him list of resources for other counseling options.  Contacted Dr. Gonzalez, [facility] and requested this pt be dc'd.  Once reviewed, he agreed pt could DC to self.  He sent in DC prescription for Seroquel as ordered in the current MAR.  Pt given CSP to be filled out prior to DC.

## 2019-02-16 NOTE — ED PROVIDER NOTES
ED PROVIDER NOTE    Scribed for Jax Ward M.D. by Harley Brandt. 2/16/2019, 2:40 AM.    This is an addendum to the note on Hamlet Islas. For further details and full chart entry, see the previously signed ED Provider Note written by Dr. Almazan ([facility]).      2:41 AM The clinical pharmacist requested to start the patient on Keppra again for his seizure disorder. Ordered Keppra 500 mg.     FINAL IMPRESSION   1. Psychosis, unspecified psychosis type (HCC)         Harley HAMLIN (Scribe), am scribing for, and in the presence of, Jax Ward M.D..    Electronically signed by: Harley Brandt (Scribe), 2/16/2019    Jax HAMLIN M.D. personally performed the services described in this documentation, as scribed by Harley Brandt in my presence, and it is both accurate and complete.    The note accurately reflects work and decisions made by me.  Jax Ward  2/16/2019  6:22 PM

## 2019-02-16 NOTE — DISCHARGE PLANNING
Alert Team:  Patient is sleeping each round.  RN aware if needed can contact Alert Team if pt request or they see him awake.

## 2019-02-16 NOTE — ED NOTES
All person items returned to patient. There is no tag to indicate safe keeping or medication in pharmacy
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
## 2019-02-16 NOTE — ED NOTES
Pt has been educated on how to get to bus station to take the [employer] which is a free bus.   He has been educated to speak to out pt psych regarding new medication and to DC Buspirone and risperidone until he speaks to his out pt psych MD. He has been educated to contact his primary care and obtain a refill for keppra if he does not have any at home.

## 2019-03-10 DIAGNOSIS — M51.36 DDD (DEGENERATIVE DISC DISEASE), LUMBAR: ICD-10-CM

## 2019-03-10 DIAGNOSIS — G43.809 OTHER MIGRAINE WITHOUT STATUS MIGRAINOSUS, NOT INTRACTABLE: ICD-10-CM

## 2019-03-11 RX ORDER — NAPROXEN 500 MG/1
TABLET ORAL
Refills: 0 | OUTPATIENT
Start: 2019-03-11

## 2019-03-13 ENCOUNTER — HOSPITAL ENCOUNTER (EMERGENCY)
Facility: MEDICAL CENTER | Age: 34
End: 2019-03-13
Attending: EMERGENCY MEDICINE
Payer: MEDICARE

## 2019-03-13 VITALS
HEART RATE: 88 BPM | OXYGEN SATURATION: 100 % | RESPIRATION RATE: 18 BRPM | DIASTOLIC BLOOD PRESSURE: 82 MMHG | BODY MASS INDEX: 24.62 KG/M2 | HEIGHT: 70 IN | WEIGHT: 172 LBS | SYSTOLIC BLOOD PRESSURE: 141 MMHG | TEMPERATURE: 98.6 F

## 2019-03-13 DIAGNOSIS — G43.809 OTHER MIGRAINE WITHOUT STATUS MIGRAINOSUS, NOT INTRACTABLE: ICD-10-CM

## 2019-03-13 PROCEDURE — 700111 HCHG RX REV CODE 636 W/ 250 OVERRIDE (IP): Performed by: EMERGENCY MEDICINE

## 2019-03-13 PROCEDURE — 700105 HCHG RX REV CODE 258: Performed by: EMERGENCY MEDICINE

## 2019-03-13 PROCEDURE — 96374 THER/PROPH/DIAG INJ IV PUSH: CPT

## 2019-03-13 PROCEDURE — 99284 EMERGENCY DEPT VISIT MOD MDM: CPT

## 2019-03-13 PROCEDURE — 96375 TX/PRO/DX INJ NEW DRUG ADDON: CPT

## 2019-03-13 RX ORDER — KETOROLAC TROMETHAMINE 30 MG/ML
15 INJECTION, SOLUTION INTRAMUSCULAR; INTRAVENOUS ONCE
Status: COMPLETED | OUTPATIENT
Start: 2019-03-13 | End: 2019-03-13

## 2019-03-13 RX ORDER — DIPHENHYDRAMINE HYDROCHLORIDE 50 MG/ML
25 INJECTION INTRAMUSCULAR; INTRAVENOUS ONCE
Status: COMPLETED | OUTPATIENT
Start: 2019-03-13 | End: 2019-03-13

## 2019-03-13 RX ORDER — METOCLOPRAMIDE HYDROCHLORIDE 5 MG/ML
10 INJECTION INTRAMUSCULAR; INTRAVENOUS ONCE
Status: COMPLETED | OUTPATIENT
Start: 2019-03-13 | End: 2019-03-13

## 2019-03-13 RX ORDER — SODIUM CHLORIDE, SODIUM LACTATE, POTASSIUM CHLORIDE, CALCIUM CHLORIDE 600; 310; 30; 20 MG/100ML; MG/100ML; MG/100ML; MG/100ML
1000 INJECTION, SOLUTION INTRAVENOUS ONCE
Status: COMPLETED | OUTPATIENT
Start: 2019-03-13 | End: 2019-03-13

## 2019-03-13 RX ORDER — NAPROXEN 500 MG/1
500 TABLET ORAL
Qty: 20 TAB | Refills: 0 | Status: SHIPPED | OUTPATIENT
Start: 2019-03-13 | End: 2019-03-18

## 2019-03-13 RX ADMIN — KETOROLAC TROMETHAMINE 15 MG: 30 INJECTION, SOLUTION INTRAMUSCULAR; INTRAVENOUS at 06:12

## 2019-03-13 RX ADMIN — SODIUM CHLORIDE, POTASSIUM CHLORIDE, SODIUM LACTATE AND CALCIUM CHLORIDE 1000 ML: 600; 310; 30; 20 INJECTION, SOLUTION INTRAVENOUS at 06:12

## 2019-03-13 RX ADMIN — METOCLOPRAMIDE 10 MG: 5 INJECTION, SOLUTION INTRAMUSCULAR; INTRAVENOUS at 06:12

## 2019-03-13 RX ADMIN — DIPHENHYDRAMINE HYDROCHLORIDE 25 MG: 50 INJECTION INTRAMUSCULAR; INTRAVENOUS at 06:12

## 2019-03-13 NOTE — ED NOTES
Pt resting on comfortably on gurney. Denies needs at this time. Pt placed on the monitor. Call light within reach.

## 2019-03-13 NOTE — ED PROVIDER NOTES
ED Provider Note    ER PROVIDER NOTE    CHIEF COMPLAINT  Chief Complaint   Patient presents with   • Headache     x 45 minutes   • Blurred Vision       HPI  Hamlet Islas is a 33 y.o. male who presents to the emergency department complaining of headache.  Patient has long-standing history of migraines and this feels the same.  He reports approximately 1 hour ago gradual onset of headache, pressure and sensation throughout his head.  He states he has some associated blurry vision in both his eyes which she often gets with his headaches.  He has had some slight nausea as well as diarrhea.  Denies any vomiting, denies any fevers or chills.  Denies any neck pain or stiffness or rash.  No abdominal pain.  He denies any focal weakness numbness or tingling.        REVIEW OF SYSTEMS  Pertinent positives include headache. Pertinent negatives include no fever. See HPI for details. All other systems reviewed and are negative.    PAST MEDICAL HISTORY   has a past medical history of Asthma; Bipolar disorder ([facility]); Bipolar disorder with depression (HCC) (9/8/2014); Chronic hip pain (9/8/2014); Chronic low back pain; Migraine; Neuropathy ([facility]); Obsessive compulsive disorder; Post traumatic stress disorder (PTSD); Psoriasis; Schizoaffective disorder ([facility]); Scoliosis (9/8/2014); and Tendinitis of foot.    SURGICAL HISTORY   has a past surgical history that includes other and irrigation & debridement ortho (Left, 12/6/2018).    FAMILY HISTORY  Family History   Problem Relation Age of Onset   • Other Mother         discoid lupus   • Cancer Mother         leukemia   • Hyperlipidemia Mother    • Hypertension Mother    • Cancer Father         lung       SOCIAL HISTORY  Social History     Social History   • Marital status: Single     Spouse name: N/A   • Number of children: N/A   • Years of education: N/A     Social History Main Topics   • Smoking status: Current Every Day Smoker     Packs/day: 0.50     Types: Cigarettes   •  "Smokeless tobacco: Never Used   • Alcohol use Yes      Comment: rare   • Drug use: Yes      Comment: meth and marijuana   • Sexual activity: Yes     Partners: Female     Other Topics Concern   • Not on file     Social History Narrative    ** Merged History Encounter **           History   Drug Use     Comment: meth and marijuana       CURRENT MEDICATIONS  Home Medications     Reviewed by Hamlet Rutledge R.N. (Registered Nurse) on 03/13/19 at 0540  Med List Status: Partial   Medication Last Dose Status   busPIRone (BUSPAR) 7.5 MG tablet  Active   levETIRAcetam (KEPPRA) 500 MG Tab  Active   QUEtiapine (SEROQUEL) 100 MG Tab  Active   risperiDONE (RISPERDAL) 3 MG Tab  Active                ALLERGIES  No Known Allergies    PHYSICAL EXAM  VITAL SIGNS: /82   Pulse 88   Temp 37 °C (98.6 °F) (Temporal)   Resp 18   Ht 1.778 m (5' 10\")   Wt 78 kg (172 lb)   SpO2 100%   BMI 24.68 kg/m²   Pulse ox interpretation: I interpret this pulse ox as normal.    Constitutional: Alert in no apparent distress.  HENT: No signs of trauma, Bilateral external ears normal, Nose normal.  Mucous membranes mildly dry  Eyes: Pupils are equal and reactive, Conjunctiva normal, Non-icteric.   Neck: Normal range of motion, No tenderness, Supple, No stridor.   Lymphatic: No lymphadenopathy noted.   Cardiovascular: Regular rate and rhythm, no murmurs.   Thorax & Lungs: Normal breath sounds, No respiratory distress, No wheezing, No chest tenderness.   Abdomen: Bowel sounds normal, Soft, No tenderness, No masses, No pulsatile masses. No peritoneal signs.  Skin: Warm, Dry, No erythema, No rash.   Back: No bony tenderness, No CVA tenderness.   Extremities: Intact distal pulses, No edema, No tenderness, No cyanosis, Negative Hector's sign.  Musculoskeletal: Good range of motion in all major joints. No tenderness to palpation or major deformities noted.   Neurologic: Alert, cranial nerves intact, speech is appropriate or not slurred, upper " extremities bilaterally exhibit no drift, no dysmetria, 5 out of 5 strength with bilateral bicep/tricep/, sensation intact to light touch throughout upper extremities. Lower extremities strength 5 out of 5 thigh extension/flexion/abduction/adduction, knee extension/flexion, dorsiflexion plantar flexion. No clonus.  2+ patella reflexes.  sensation intact to light touch.  No focal deficits noted. Ambulates with steady gait, steady tandem gait  Psychiatric: Affect normal, Judgment normal, Mood normal.       DIAGNOSTIC STUDIES / PROCEDURES      COURSE & MEDICAL DECISION MAKING  Nursing notes, VS, PMSFHx reviewed in chart.    5:58 AM Patient seen and examined at bedside. Patient will be treated with ketorolac, Reglan, Benadryl, IV fluids.   HYDRATION: Based on the patient's presentation of Dehydration the patient was given IV fluids. IV Hydration was used because oral hydration failed due to Patient's nausea. Upon recheck following hydration, the patient was Improved.    7:04 AM  Patient reevaluated, feeling improved.  Headache is almost essentially resolved, now he states he just feels sleepy, no other complaints.  Will plan for discharge      Decision Making:  This is a 33 y.o. male presented with headache.  Likely migraine given exact similarity to past as well as the lack of rapid onset and severity of the headache, in addition to the well appearance of the pt makes the dx of subarchnoid hemorrhage less likely. The normal vital signs, lack of a temperature and lack of meningeal signs (supple neck without pain with rom) makes the dx of meningitis less likely.   The patient has no neurologic signs, no fever, and is immunocompetent therefore the time course would be inconsistent with abscess. The absence of neurologic signs and the short duration of sx make neoplasm unlikely. There is no history or physical exam sign of trauma, and a normal neurologic exam making any traumatic head bleed (sdh/sah/iph/edh) unlikely.  Toxic and metabolic causes of headache are also unlikely given no other signs or symptoms of such a disorder.  The patient is improved with normal vitals, a normal neurologic exam, normal gait, and is discharged home with pcp follow-up and return precautions.     The patient will return for new or worsening symptoms and is stable at the time of discharge.    The patient is referred to a primary physician for blood pressure management, diabetic screening, and for all other preventative health concerns.      DISPOSITION:  Patient will be discharged home in stable condition.    FOLLOW UP:  NANCY Cartagena  [address] [place] [id number]  [phone]    In 1 week        OUTPATIENT MEDICATIONS:  New Prescriptions    NAPROXEN (NAPROSYN) 500 MG TAB    Take 1 Tab by mouth 2 times daily with meals as needed (pain) for up to 5 days.       FINAL IMPRESSION  1. Other migraine without status migrainosus, not intractable         The note accurately reflects work and decisions made by me.  Checo Panda  3/13/2019  8:30 AM

## 2019-03-13 NOTE — ED NOTES
Discharge orders received, IV and monitor discontinued, instructions and education given, follow-up discussed, prescription x1 given, pt verbalized understanding.

## 2019-03-13 NOTE — ED TRIAGE NOTES
Pt to triage c/o headache and blurred vision x 45 minutes, denies trauma/fever, aox4, resp even/unlabored

## 2019-09-23 ENCOUNTER — TELEPHONE (OUTPATIENT)
Dept: MEDICAL GROUP | Facility: MEDICAL CENTER | Age: 34
End: 2019-09-23

## 2019-09-23 NOTE — TELEPHONE ENCOUNTER
Called pt, someone answered and said she will let him know we're trying to get a hold of him to make an cassius for his medication refill. She said she hasn't seen the patient in a while and this was the only number on file.

## 2019-09-27 DIAGNOSIS — Z86.69 HX OF SEIZURE DISORDER: ICD-10-CM

## 2019-09-30 RX ORDER — QUETIAPINE FUMARATE 100 MG/1
TABLET, FILM COATED ORAL
Qty: 30 TAB | Refills: 0 | OUTPATIENT
Start: 2019-09-30

## 2019-09-30 RX ORDER — ONDANSETRON 4 MG/1
4 TABLET, ORALLY DISINTEGRATING ORAL ONCE
Qty: 15 TAB | Refills: 0 | OUTPATIENT
Start: 2019-09-30 | End: 2019-09-30

## 2019-09-30 RX ORDER — MELOXICAM 7.5 MG/1
7.5 TABLET ORAL
Qty: 15 TAB | Refills: 0 | OUTPATIENT
Start: 2019-09-30

## 2019-09-30 RX ORDER — RISPERIDONE 3 MG/1
3 TABLET ORAL 2 TIMES DAILY
Qty: 15 TAB | Refills: 0 | OUTPATIENT
Start: 2019-09-30

## 2019-09-30 RX ORDER — BUSPIRONE HYDROCHLORIDE 7.5 MG/1
TABLET ORAL
Qty: 30 TAB | Refills: 0 | OUTPATIENT
Start: 2019-09-30

## 2019-09-30 RX ORDER — LEVETIRACETAM 500 MG/1
500 TABLET ORAL 2 TIMES DAILY
Qty: 30 TAB | Refills: 0 | OUTPATIENT
Start: 2019-09-30

## 2020-10-19 RX ORDER — BUSPIRONE HYDROCHLORIDE 7.5 MG/1
7.5 TABLET ORAL
Qty: 60 TAB | Refills: 0 | OUTPATIENT
Start: 2020-10-19

## 2020-10-19 NOTE — TELEPHONE ENCOUNTER
Patient not seen since 2018 and warning given on last refill that visit is needed so I can no longer refill these medicines

## 2022-11-04 ENCOUNTER — PATIENT MESSAGE (OUTPATIENT)
Dept: HEALTH INFORMATION MANAGEMENT | Facility: OTHER | Age: 37
End: 2022-11-04

## 2023-11-13 NOTE — PROGRESS NOTES
Infectious Disease Progress Note    Author: Bernadine Asif M.D. Date & Time of service: 2018  12:01 PM    Chief Complaint:  Left thumb infection    Interval History:  2018-no fevers.  Slightly sedated due to risperidone.  As per the nursing patient was earlier alert oriented x3  2018-patient is still sleepy.  No new issues overnight  Labs Reviewed, Medications Reviewed, Radiology Reviewed and Wound Reviewed.    Review of Systems:  Review of Systems   Unable to perform ROS: Other (Patient just took risperidone)       Hemodynamics:  Temp (24hrs), Av.1 °C (98.7 °F), Min:36.8 °C (98.3 °F), Max:37.2 °C (99 °F)  Temperature: 37.2 °C (99 °F)  Pulse  Av.1  Min: 54  Max: 118   Blood Pressure: 109/71       Physical Exam:  Physical Exam   Constitutional: No distress.   HENT:   Mouth/Throat: No oropharyngeal exudate.   Edentulous   Eyes: Pupils are equal, round, and reactive to light. No scleral icterus.   Neck: Neck supple.   Cardiovascular: Regular rhythm.    No murmur heard.  Pulmonary/Chest: He has no wheezes. He has no rales.   Abdominal: Soft. There is no tenderness. There is no rebound and no guarding.   Musculoskeletal: He exhibits no edema or tenderness.   Left hand is bandaged   Lymphadenopathy:     He has no cervical adenopathy.   Neurological:   Sleepy   Skin: Skin is warm. No erythema.   Vitals reviewed.      Meds:    Current Facility-Administered Medications:   •  risperiDONE  •  levETIRAcetam  •  senna-docusate **AND** polyethylene glycol/lytes **AND** magnesium hydroxide **AND** bisacodyl  •  NS  •  NS  •  heparin  •  ampicillin-sulbactam (UNASYN) IV  •  nicotine **AND** Nicotine Replacement Patient Education Materials **AND** nicotine polacrilex  •  ondansetron  •  ondansetron  •  promethazine  •  promethazine  •  prochlorperazine  •  acetaminophen  •  Notify provider if pain remains uncontrolled **AND** Use the numeric rating scale (NRS-11) on regular floors and Critical-Care Pain  Teaching Attestation:   I have discussed the history, exam and medical decision making with the resident and agree with the assessment and plan as documented.  The patient was evaluated and discussed with the supervising PL3 senior resident while staffing with the attending.       Observation Tool (CPOT) on ICUs/Trauma to assess pain **AND** Pulse Ox (Oximetry) **AND** Pharmacy Consult Request **AND** If patient difficult to arouse and/or has respiratory depression, stop any opiates that are currently infusing and call a Rapid Response. **AND** oxyCODONE immediate-release **AND** oxyCODONE immediate-release **AND** [DISCONTINUED] morphine injection  •  haloperidol lactate    Labs:  Recent Labs      12/07/18   0430   WBC  7.3   RBC  4.38*   HEMOGLOBIN  13.2*   HEMATOCRIT  39.3*   MCV  89.7   MCH  30.1   RDW  41.6   PLATELETCT  362   MPV  8.7*     Recent Labs      12/07/18   0430   SODIUM  137   POTASSIUM  4.0   CHLORIDE  107   CO2  24   GLUCOSE  108*   BUN  9     Recent Labs      12/07/18   0430   CREATININE  0.87       Imaging:  Ct-hand With Left    Result Date: 12/6/2018 12/6/2018 3:36 AM HISTORY/REASON FOR EXAM:  Crush injury to left hand with swelling and discoloration of left thumb. TECHNIQUE/ EXAM DESCRIPTION AND NUMBER OF VIEWS: CT scan of the LEFT hand with contrast, with reconstructions. Thin-section noncontrast helical images were obtained from the distal radius/ulna through the proximal metacarpals. Coronal and sagittal reconstructions were generated from the axial images. A total of 80 mL of Omnipaque 350 nonionic contrast was administered IV without complication. Up to date radiation dose reduction adjustments have been utilized to meet ALARA standards for radiation dose reduction. COMPARISON:  None. FINDINGS: No hand or wrist fracture or dislocation is identified. There is diffuse edema overlying the extensor aspect of the hand and wrist. There is soft tissue swelling along the extensor aspect of the left thumb diffusely consistent with hematoma or edema.     1.  Extensive subcutaneous fluid along the extensor aspect of the left thumb which may represent edema or hematoma. 2.  Extensive fluid or edema overlying the extensor aspect of the left hand and wrist. 3.  No acute hand  fracture or dislocation.      Micro:  Results     Procedure Component Value Units Date/Time    CULTURE WOUND W/ GRAM STAIN [[id number]]  (Abnormal)  (Susceptibility) Collected:  12/06/18 1137    Order Status:  Completed Specimen:  Wound Updated:  12/08/18 1005     Significant Indicator POS (POS)     Source WND     Site Left Thumb Infection     Culture Result Wound -- (A)     Gram Stain Result Rare WBCs.  No organisms seen.       Culture Result Wound Staphylococcus aureus  Light growth   (A)      Beta Hemolytic Streptococcus group A  Light growth   (A)    Narrative:       CALL  Mora  131 tel. [phone],  CALLED  131 tel. [phone] 12/07/2018, 15:15, RB PERF. RESULTS CALLED TO:  Adilene Go RN    Culture & Susceptibility     STAPHYLOCOCCUS AUREUS     Antibiotic Sensitivity Microscan Unit Status    Ampicillin/sulbactam Sensitive <=8/4 mcg/mL Final    Method: SENSITIVITY, YOHAN    Clindamycin Sensitive <=0.5 mcg/mL Final    Method: SENSITIVITY, YOHAN    Daptomycin Sensitive 1 mcg/mL Final    Method: SENSITIVITY, YOHAN    Erythromycin Sensitive <=0.5 mcg/mL Final    Method: SENSITIVITY, YOHAN    Moxifloxacin Sensitive <=0.5 mcg/mL Final    Method: SENSITIVITY, YOHAN    Oxacillin Sensitive <=0.25 mcg/mL Final    Method: SENSITIVITY, YOHAN    Tetracycline Sensitive <=4 mcg/mL Final    Method: SENSITIVITY, YOHAN    Trimeth/Sulfa Sensitive <=0.5/9.5 mcg/mL Final    Method: SENSITIVITY, YOHAN    Vancomycin Sensitive 2 mcg/mL Final    Method: SENSITIVITY, YOHAN                       ANAEROBIC CULTURE [[id number]] Collected:  12/06/18 1137    Order Status:  Completed Specimen:  Wound Updated:  12/08/18 1005     Significant Indicator NEG     Source WND     Site Left Thumb Infection     Anaerobic Culture, Culture Res Culture in progress.    Narrative:       CALL  Mora  131 tel. [phone],  CALLED  131 tel. [phone] 12/07/2018, 15:15, RB PERF. RESULTS CALLED TO:  Adilene Go RN    BLOOD CULTURE x2 [[id number]] Collected:  12/06/18 0232     "Order Status:  Completed Specimen:  Blood from Peripheral Updated:  12/07/18 0914     Significant Indicator NEG     Source BLD     Site PERIPHERAL     Blood Culture No Growth    Note: Blood cultures are incubated for 5 days and  are monitored continuously.Positive blood cultures  are called to the RN and reported as soon as  they are identified.      Narrative:       Per Hospital Policy: Only change Specimen Src: to \"Line\" if  specified by physician order.    BLOOD CULTURE x2 [[id number]] Collected:  12/06/18 0230    Order Status:  Completed Specimen:  Blood from Peripheral Updated:  12/07/18 0913     Significant Indicator NEG     Source BLD     Site PERIPHERAL     Blood Culture No Growth    Note: Blood cultures are incubated for 5 days and  are monitored continuously.Positive blood cultures  are called to the RN and reported as soon as  they are identified.      Narrative:       Per Hospital Policy: Only change Specimen Src: to \"Line\" if  specified by physician order.    MRSA BY PCR (AMP) [[id number]]     Order Status:  No result Specimen:  Respirate from Nares     GRAM STAIN [[id number]] Collected:  12/06/18 1137    Order Status:  Completed Specimen:  Wound Updated:  12/06/18 1519     Significant Indicator .     Source WND     Site Left Thumb Infection     Gram Stain Result Rare WBCs.  No organisms seen.            Assessment:  Active Hospital Problems    Diagnosis   • *Infected abrasion of hand, left, initial encounter [S60.512A, L08.9]   • SIRS (systemic inflammatory response syndrome) ([facility]) [R65.10]   • Tobacco abuse [Z72.0]   • Polysubstance abuse ([facility]) [F19.10]   • Noncompliance [Z91.19]   • Hx of seizure disorder [Z86.69]   • Bipolar disorder, mixed ([facility]) [F31.60]       Plan:  Left thumb infection/tenosynovitis  Underwent I&D on 12/6/2018  OR cultures are MSSA and group A strep  Discontinue vancomycin  Discontinue Unasyn  May be able to discharge on p.o. Clindamycin  Aim for 4-week  Sed rate is 10 and CRP is " 6.65    Polysubstance abuse  Hepatitis B, hepatitis C and HIV are nonreactive  Not a candidate for IV antibiotics    We will follow him up as an outpatient  Discussed with internal medicine.

## 2024-08-15 NOTE — DISCHARGE INSTRUCTIONS
Contusion  A contusion is a deep bruise. Contusions happen when an injury causes bleeding under the skin. Signs of bruising include pain, puffiness (swelling), and discolored skin. The contusion may turn blue, purple, or yellow.  HOME CARE   · Put ice on the injured area.  ¨ Put ice in a plastic bag.  ¨ Place a towel between your skin and the bag.  ¨ Leave the ice on for 15-20 minutes, 03-04 times a day.  · Only take medicine as told by your doctor.  · Rest the injured area.  · If possible, raise (elevate) the injured area to lessen puffiness.  GET HELP RIGHT AWAY IF:   · You have more bruising or puffiness.  · You have pain that is getting worse.  · Your puffiness or pain is not helped by medicine.  MAKE SURE YOU:   · Understand these instructions.  · Will watch your condition.  · Will get help right away if you are not doing well or get worse.     This information is not intended to replace advice given to you by your health care provider. Make sure you discuss any questions you have with your health care provider.     Document Released: 06/05/2009 Document Revised: 03/11/2013 Document Reviewed: 10/22/2012  [employer] Interactive Patient Education ©2016 [employer]    
Speaking Coherently

## (undated) DEVICE — NEPTUNE 4 PORT MANIFOLD - (20/PK)

## (undated) DEVICE — TUBING CLEARLINK DUO-VENT - C-FLO (48EA/CA)

## (undated) DEVICE — LACTATED RINGERS INJ 1000 ML - (14EA/CA 60CA/PF)

## (undated) DEVICE — TIP INTPLS HFLO ML ORFC BTRY - (12/CS)  FOR SURGILAV

## (undated) DEVICE — ELECTRODE DUAL RETURN W/ CORD - (50/PK)

## (undated) DEVICE — KIT ANESTHESIA W/CIRCUIT & 3/LT BAG W/FILTER (20EA/CA)

## (undated) DEVICE — GLOVE BIOGEL SZ 7 SURGICAL PF LTX - (50PR/BX 4BX/CA)

## (undated) DEVICE — SLEEVE, VASO, THIGH, MED

## (undated) DEVICE — WRAP COBAN SELF-ADHERENT 6 IN X  5YDS STERILE TAN (12/CA)

## (undated) DEVICE — SWAB ANAEROBIC SPEC.COLLECTOR - (25/PK 4PK/CA 100EA/CA)

## (undated) DEVICE — WATER IRRIG. STER. 1500 ML - (9/CA)

## (undated) DEVICE — CANISTER SUCTION 3000ML MECHANICAL FILTER AUTO SHUTOFF MEDI-VAC NONSTERILE LF DISP  (40EA/CA)

## (undated) DEVICE — HANDPIECE 10FT INTPLS SCT PLS IRRIGATION HAND CONTROL SET (6/PK)

## (undated) DEVICE — MASK ANESTHESIA ADULT  - (100/CA)

## (undated) DEVICE — PROTECTOR ULNA NERVE - (36PR/CA)

## (undated) DEVICE — GOWN WARMING STANDARD FLEX - (30/CA)

## (undated) DEVICE — ELECTRODE 850 FOAM ADHESIVE - HYDROGEL RADIOTRNSPRNT (50/PK)

## (undated) DEVICE — SET LEADWIRE 5 LEAD BEDSIDE DISPOSABLE ECG (1SET OF 5/EA)

## (undated) DEVICE — BANDAGE STERILE 3 IN X 75 IN (12EA/BX 8BX/CA)

## (undated) DEVICE — SENSOR SPO2 NEO LNCS ADHESIVE (20/BX) SEE USER NOTES

## (undated) DEVICE — WRAP CO-FLEX 4IN X 5YD STERIL - SELF-ADHERENT (18/CA)

## (undated) DEVICE — TUBE, CULTURE AEROBIC

## (undated) DEVICE — SUCTION INSTRUMENT YANKAUER BULBOUS TIP W/O VENT (50EA/CA)

## (undated) DEVICE — HEAD HOLDER JUNIOR/ADULT

## (undated) DEVICE — SET EXTENSION WITH 2 PORTS (48EA/CA) ***PART #2C8610 IS A SUBSTITUTE*****

## (undated) DEVICE — DRAPE U ORTHOPEDIC - (10/BX)

## (undated) DEVICE — KIT ROOM DECONTAMINATION

## (undated) DEVICE — MASK, LARYNGEAL AIRWAY #4

## (undated) DEVICE — DRESSING PETROLEUM GAUZE 5 X 9" (50EA/BX 4BX/CA)"

## (undated) DEVICE — PAD LAP STERILE 18 X 18 - (5/PK 40PK/CA)

## (undated) DEVICE — SODIUM CHL IRRIGATION 0.9% 1000ML (12EA/CA)

## (undated) DEVICE — PACK LOWER EXTREMITY - (2/CA)

## (undated) DEVICE — SODIUM CHL. IRRIGATION 0.9% 3000ML (4EA/CA 65CA/PF)

## (undated) DEVICE — GLOVE BIOGEL INDICATOR SZ 7.5 SURGICAL PF LTX - (50PR/BX 4BX/CA)

## (undated) DEVICE — STAPLER SKIN DISP - (6/BX 10BX/CA) VISISTAT